# Patient Record
Sex: MALE | Race: WHITE | NOT HISPANIC OR LATINO
[De-identification: names, ages, dates, MRNs, and addresses within clinical notes are randomized per-mention and may not be internally consistent; named-entity substitution may affect disease eponyms.]

---

## 2016-12-30 NOTE — H&P PST ADULT - FAMILY HISTORY
Mother  Still living? No  Family history of throat cancer, Age at diagnosis: Age Unknown     Father  Still living? Yes, Estimated age: 81-90  Family history of peripheral vascular disease, Age at diagnosis: Age Unknown

## 2016-12-30 NOTE — H&P PST ADULT - PROBLEM SELECTOR PLAN 1
right THR, medical clearance requested. pepcid and surgical wash instructions reviewed and verbalized understanding. levothyroxine AM of surgery with sips of water. stop aleve 10 days prior to surgery.

## 2016-12-30 NOTE — H&P PST ADULT - MUSCULOSKELETAL
details… detailed exam no calf tenderness/normal strength/no joint swelling/no joint erythema/ROM intact/increased right groin pain with right hip ROM/no joint warmth

## 2016-12-30 NOTE — H&P PST ADULT - HISTORY OF PRESENT ILLNESS
56 year old male presents with 5 year history of right hip and groin pain which worsened over the last year. Also reports low back pain. Denies receiving any PT or injections into the hip. Pain 5/10 at rest and 8/10 with activity. Pain relieved with aleve.

## 2016-12-30 NOTE — H&P PST ADULT - RS GEN PE MLT RESP DETAILS PC
no rhonchi/breath sounds equal/no wheezes/no rales/no intercostal retractions/clear to auscultation bilaterally/no chest wall tenderness/good air movement/respirations non-labored/airway patent

## 2017-01-03 ENCOUNTER — APPOINTMENT (OUTPATIENT)
Dept: ORTHOPEDIC SURGERY | Facility: CLINIC | Age: 57
End: 2017-01-03

## 2017-01-10 ENCOUNTER — TRANSCRIPTION ENCOUNTER (OUTPATIENT)
Age: 57
End: 2017-01-10

## 2017-01-17 ENCOUNTER — RESULT REVIEW (OUTPATIENT)
Age: 57
End: 2017-01-17

## 2017-01-17 RX ORDER — SODIUM CHLORIDE 9 MG/ML
1000 INJECTION, SOLUTION INTRAVENOUS
Qty: 0 | Refills: 0 | Status: DISCONTINUED | OUTPATIENT
Start: 2017-01-18 | End: 2017-01-20

## 2017-01-17 RX ORDER — SENNA PLUS 8.6 MG/1
2 TABLET ORAL AT BEDTIME
Qty: 0 | Refills: 0 | Status: DISCONTINUED | OUTPATIENT
Start: 2017-01-18 | End: 2017-01-20

## 2017-01-17 RX ORDER — POLYETHYLENE GLYCOL 3350 17 G/17G
17 POWDER, FOR SOLUTION ORAL DAILY
Qty: 0 | Refills: 0 | Status: DISCONTINUED | OUTPATIENT
Start: 2017-01-18 | End: 2017-01-20

## 2017-01-17 RX ORDER — CELECOXIB 200 MG/1
200 CAPSULE ORAL ONCE
Qty: 0 | Refills: 0 | Status: COMPLETED | OUTPATIENT
Start: 2017-01-18 | End: 2017-01-18

## 2017-01-17 RX ORDER — DOCUSATE SODIUM 100 MG
100 CAPSULE ORAL THREE TIMES A DAY
Qty: 0 | Refills: 0 | Status: DISCONTINUED | OUTPATIENT
Start: 2017-01-18 | End: 2017-01-20

## 2017-01-17 RX ORDER — MAGNESIUM HYDROXIDE 400 MG/1
30 TABLET, CHEWABLE ORAL DAILY
Qty: 0 | Refills: 0 | Status: DISCONTINUED | OUTPATIENT
Start: 2017-01-18 | End: 2017-01-20

## 2017-01-17 NOTE — PROVIDER CONTACT NOTE (OTHER) - ACTION/TREATMENT ORDERED:
mupericin topical escribed; spoke to patient, understands treatment
spoke to pt. who states using Mupirocin. Verbalized understanding to use 2x/day for a total of 5 days. Reminded to bring checklist to hospital on day of surgery
labs faxed office notified

## 2017-01-17 NOTE — PATIENT PROFILE ADULT. - PMH
ADHD (attention deficit hyperactivity disorder)    Hypothyroid    Lumbar disc herniation  L4-5, intermittent right lower leg tingling  Muscle atrophy  right leg  Osteoarthritis

## 2017-01-18 ENCOUNTER — APPOINTMENT (OUTPATIENT)
Dept: ORTHOPEDIC SURGERY | Facility: HOSPITAL | Age: 57
End: 2017-01-18

## 2017-01-18 ENCOUNTER — INPATIENT (INPATIENT)
Facility: HOSPITAL | Age: 57
LOS: 1 days | Discharge: HOME | DRG: 470 | End: 2017-01-20
Attending: INTERNAL MEDICINE | Admitting: ORTHOPAEDIC SURGERY
Payer: COMMERCIAL

## 2017-01-18 VITALS
OXYGEN SATURATION: 98 % | SYSTOLIC BLOOD PRESSURE: 137 MMHG | HEART RATE: 66 BPM | TEMPERATURE: 98 F | RESPIRATION RATE: 18 BRPM | HEIGHT: 71 IN | WEIGHT: 214.73 LBS | DIASTOLIC BLOOD PRESSURE: 84 MMHG

## 2017-01-18 DIAGNOSIS — Z01.818 ENCOUNTER FOR OTHER PREPROCEDURAL EXAMINATION: ICD-10-CM

## 2017-01-18 DIAGNOSIS — M16.0 BILATERAL PRIMARY OSTEOARTHRITIS OF HIP: ICD-10-CM

## 2017-01-18 DIAGNOSIS — Z90.89 ACQUIRED ABSENCE OF OTHER ORGANS: Chronic | ICD-10-CM

## 2017-01-18 LAB
ANION GAP SERPL CALC-SCNC: 11 MMOL/L — SIGNIFICANT CHANGE UP (ref 5–17)
BUN SERPL-MCNC: 15 MG/DL — SIGNIFICANT CHANGE UP (ref 7–23)
CALCIUM SERPL-MCNC: 9.6 MG/DL — SIGNIFICANT CHANGE UP (ref 8.4–10.5)
CHLORIDE SERPL-SCNC: 103 MMOL/L — SIGNIFICANT CHANGE UP (ref 96–108)
CO2 SERPL-SCNC: 23 MMOL/L — SIGNIFICANT CHANGE UP (ref 22–31)
CREAT SERPL-MCNC: 1.3 MG/DL — SIGNIFICANT CHANGE UP (ref 0.5–1.3)
GLUCOSE SERPL-MCNC: 180 MG/DL — HIGH (ref 70–99)
HCT VFR BLD CALC: 45.7 % — SIGNIFICANT CHANGE UP (ref 39–50)
HGB BLD-MCNC: 14.7 G/DL — SIGNIFICANT CHANGE UP (ref 13–17)
POTASSIUM SERPL-MCNC: 3.9 MMOL/L — SIGNIFICANT CHANGE UP (ref 3.5–5.3)
POTASSIUM SERPL-SCNC: 3.9 MMOL/L — SIGNIFICANT CHANGE UP (ref 3.5–5.3)
SODIUM SERPL-SCNC: 137 MMOL/L — SIGNIFICANT CHANGE UP (ref 135–145)

## 2017-01-18 PROCEDURE — 99223 1ST HOSP IP/OBS HIGH 75: CPT

## 2017-01-18 PROCEDURE — 88311 DECALCIFY TISSUE: CPT | Mod: 26

## 2017-01-18 PROCEDURE — 27130 TOTAL HIP ARTHROPLASTY: CPT | Mod: RT

## 2017-01-18 PROCEDURE — 88305 TISSUE EXAM BY PATHOLOGIST: CPT | Mod: 26

## 2017-01-18 RX ORDER — CELECOXIB 200 MG/1
200 CAPSULE ORAL
Qty: 0 | Refills: 0 | Status: DISCONTINUED | OUTPATIENT
Start: 2017-01-18 | End: 2017-01-20

## 2017-01-18 RX ORDER — HYDROMORPHONE HYDROCHLORIDE 2 MG/ML
30 INJECTION INTRAMUSCULAR; INTRAVENOUS; SUBCUTANEOUS
Qty: 0 | Refills: 0 | Status: DISCONTINUED | OUTPATIENT
Start: 2017-01-18 | End: 2017-01-19

## 2017-01-18 RX ORDER — LEVOTHYROXINE SODIUM 125 MCG
175 TABLET ORAL DAILY
Qty: 0 | Refills: 0 | Status: DISCONTINUED | OUTPATIENT
Start: 2017-01-18 | End: 2017-01-20

## 2017-01-18 RX ORDER — ACETAMINOPHEN 500 MG
1000 TABLET ORAL EVERY 6 HOURS
Qty: 0 | Refills: 0 | Status: DISCONTINUED | OUTPATIENT
Start: 2017-01-18 | End: 2017-01-18

## 2017-01-18 RX ORDER — ACETAMINOPHEN 500 MG
1000 TABLET ORAL EVERY 8 HOURS
Qty: 0 | Refills: 0 | Status: DISCONTINUED | OUTPATIENT
Start: 2017-01-19 | End: 2017-01-20

## 2017-01-18 RX ORDER — CEFAZOLIN SODIUM 1 G
2000 VIAL (EA) INJECTION EVERY 8 HOURS
Qty: 0 | Refills: 0 | Status: COMPLETED | OUTPATIENT
Start: 2017-01-18 | End: 2017-01-19

## 2017-01-18 RX ORDER — ACETAMINOPHEN 500 MG
1000 TABLET ORAL EVERY 6 HOURS
Qty: 0 | Refills: 0 | Status: COMPLETED | OUTPATIENT
Start: 2017-01-18 | End: 2017-01-19

## 2017-01-18 RX ORDER — HYDROMORPHONE HYDROCHLORIDE 2 MG/ML
0.5 INJECTION INTRAMUSCULAR; INTRAVENOUS; SUBCUTANEOUS
Qty: 0 | Refills: 0 | Status: DISCONTINUED | OUTPATIENT
Start: 2017-01-18 | End: 2017-01-18

## 2017-01-18 RX ORDER — TRANEXAMIC ACID 100 MG/ML
1000 INJECTION, SOLUTION INTRAVENOUS ONCE
Qty: 0 | Refills: 0 | Status: COMPLETED | OUTPATIENT
Start: 2017-01-18 | End: 2017-01-18

## 2017-01-18 RX ORDER — DEXTROAMPHETAMINE SACCHARATE, AMPHETAMINE ASPARTATE, DEXTROAMPHETAMINE SULFATE AND AMPHETAMINE SULFATE 1.875; 1.875; 1.875; 1.875 MG/1; MG/1; MG/1; MG/1
15 TABLET ORAL
Qty: 0 | Refills: 0 | Status: DISCONTINUED | OUTPATIENT
Start: 2017-01-18 | End: 2017-01-18

## 2017-01-18 RX ORDER — DIPHENHYDRAMINE HCL 50 MG
25 CAPSULE ORAL EVERY 8 HOURS
Qty: 0 | Refills: 0 | Status: DISCONTINUED | OUTPATIENT
Start: 2017-01-18 | End: 2017-01-20

## 2017-01-18 RX ORDER — CEFAZOLIN SODIUM 1 G
2000 VIAL (EA) INJECTION ONCE
Qty: 0 | Refills: 0 | Status: COMPLETED | OUTPATIENT
Start: 2017-01-18 | End: 2017-01-18

## 2017-01-18 RX ORDER — PANTOPRAZOLE SODIUM 20 MG/1
40 TABLET, DELAYED RELEASE ORAL
Qty: 0 | Refills: 0 | Status: DISCONTINUED | OUTPATIENT
Start: 2017-01-18 | End: 2017-01-20

## 2017-01-18 RX ORDER — HYDROMORPHONE HYDROCHLORIDE 2 MG/ML
0.5 INJECTION INTRAMUSCULAR; INTRAVENOUS; SUBCUTANEOUS
Qty: 0 | Refills: 0 | Status: DISCONTINUED | OUTPATIENT
Start: 2017-01-18 | End: 2017-01-20

## 2017-01-18 RX ORDER — ACETAMINOPHEN 500 MG
1000 TABLET ORAL ONCE
Qty: 0 | Refills: 0 | Status: COMPLETED | OUTPATIENT
Start: 2017-01-18 | End: 2017-01-18

## 2017-01-18 RX ORDER — SODIUM CHLORIDE 9 MG/ML
1000 INJECTION, SOLUTION INTRAVENOUS
Qty: 0 | Refills: 0 | Status: DISCONTINUED | OUTPATIENT
Start: 2017-01-18 | End: 2017-01-18

## 2017-01-18 RX ORDER — ASPIRIN/CALCIUM CARB/MAGNESIUM 324 MG
325 TABLET ORAL DAILY
Qty: 0 | Refills: 0 | Status: DISCONTINUED | OUTPATIENT
Start: 2017-01-19 | End: 2017-01-20

## 2017-01-18 RX ORDER — ONDANSETRON 8 MG/1
4 TABLET, FILM COATED ORAL EVERY 6 HOURS
Qty: 0 | Refills: 0 | Status: DISCONTINUED | OUTPATIENT
Start: 2017-01-18 | End: 2017-01-20

## 2017-01-18 RX ORDER — MEPERIDINE HYDROCHLORIDE 50 MG/ML
12.5 INJECTION INTRAMUSCULAR; INTRAVENOUS; SUBCUTANEOUS
Qty: 0 | Refills: 0 | Status: DISCONTINUED | OUTPATIENT
Start: 2017-01-18 | End: 2017-01-18

## 2017-01-18 RX ADMIN — ONDANSETRON 4 MILLIGRAM(S): 8 TABLET, FILM COATED ORAL at 20:00

## 2017-01-18 RX ADMIN — Medication 100 MILLIGRAM(S): at 20:00

## 2017-01-18 RX ADMIN — HYDROMORPHONE HYDROCHLORIDE 30 MILLILITER(S): 2 INJECTION INTRAMUSCULAR; INTRAVENOUS; SUBCUTANEOUS at 19:51

## 2017-01-18 RX ADMIN — SENNA PLUS 2 TABLET(S): 8.6 TABLET ORAL at 21:14

## 2017-01-18 RX ADMIN — Medication 1000 MILLIGRAM(S): at 20:17

## 2017-01-18 RX ADMIN — Medication 100 MILLIGRAM(S): at 21:14

## 2017-01-18 RX ADMIN — CELECOXIB 200 MILLIGRAM(S): 200 CAPSULE ORAL at 11:13

## 2017-01-18 RX ADMIN — HYDROMORPHONE HYDROCHLORIDE 30 MILLILITER(S): 2 INJECTION INTRAMUSCULAR; INTRAVENOUS; SUBCUTANEOUS at 15:35

## 2017-01-18 RX ADMIN — Medication 400 MILLIGRAM(S): at 19:10

## 2017-01-18 NOTE — OCCUPATIONAL THERAPY INITIAL EVALUATION ADULT - IADL RETRAINING, OT EVAL
Patient will increase standing tolerance to 3-5 minutes for grooming at the sink with in 2-3 sessions.

## 2017-01-18 NOTE — PHYSICAL THERAPY INITIAL EVALUATION ADULT - GENERAL OBSERVATIONS, REHAB EVAL
pt resting in PACU. pt had spinal anesthesia. pt with IV pt resting in PACU. pt had spinal anesthesia. pt with IV, hemovac

## 2017-01-19 ENCOUNTER — TRANSCRIPTION ENCOUNTER (OUTPATIENT)
Age: 57
End: 2017-01-19

## 2017-01-19 LAB
ANION GAP SERPL CALC-SCNC: 8 MMOL/L — SIGNIFICANT CHANGE UP (ref 5–17)
BUN SERPL-MCNC: 15 MG/DL — SIGNIFICANT CHANGE UP (ref 7–23)
CALCIUM SERPL-MCNC: 9 MG/DL — SIGNIFICANT CHANGE UP (ref 8.4–10.5)
CHLORIDE SERPL-SCNC: 103 MMOL/L — SIGNIFICANT CHANGE UP (ref 96–108)
CO2 SERPL-SCNC: 27 MMOL/L — SIGNIFICANT CHANGE UP (ref 22–31)
CREAT SERPL-MCNC: 1.12 MG/DL — SIGNIFICANT CHANGE UP (ref 0.5–1.3)
GLUCOSE SERPL-MCNC: 100 MG/DL — HIGH (ref 70–99)
HCT VFR BLD CALC: 37.3 % — LOW (ref 39–50)
HGB BLD-MCNC: 12.9 G/DL — LOW (ref 13–17)
MCHC RBC-ENTMCNC: 28.3 PG — SIGNIFICANT CHANGE UP (ref 27–34)
MCHC RBC-ENTMCNC: 34.6 GM/DL — SIGNIFICANT CHANGE UP (ref 32–36)
MCV RBC AUTO: 81.8 FL — SIGNIFICANT CHANGE UP (ref 80–100)
PLATELET # BLD AUTO: 238 K/UL — SIGNIFICANT CHANGE UP (ref 150–400)
POTASSIUM SERPL-MCNC: 4.1 MMOL/L — SIGNIFICANT CHANGE UP (ref 3.5–5.3)
POTASSIUM SERPL-SCNC: 4.1 MMOL/L — SIGNIFICANT CHANGE UP (ref 3.5–5.3)
RBC # BLD: 4.56 M/UL — SIGNIFICANT CHANGE UP (ref 4.2–5.8)
RBC # FLD: 11.8 % — SIGNIFICANT CHANGE UP (ref 10.3–14.5)
SODIUM SERPL-SCNC: 138 MMOL/L — SIGNIFICANT CHANGE UP (ref 135–145)
WBC # BLD: 11.3 K/UL — HIGH (ref 3.8–10.5)
WBC # FLD AUTO: 11.3 K/UL — HIGH (ref 3.8–10.5)

## 2017-01-19 PROCEDURE — 99232 SBSQ HOSP IP/OBS MODERATE 35: CPT

## 2017-01-19 RX ORDER — CELECOXIB 200 MG/1
1 CAPSULE ORAL
Qty: 40 | Refills: 0 | OUTPATIENT
Start: 2017-01-19

## 2017-01-19 RX ORDER — OXYCODONE HYDROCHLORIDE 5 MG/1
10 TABLET ORAL
Qty: 0 | Refills: 0 | Status: DISCONTINUED | OUTPATIENT
Start: 2017-01-19 | End: 2017-01-20

## 2017-01-19 RX ORDER — OXYCODONE HYDROCHLORIDE 5 MG/1
5 TABLET ORAL
Qty: 0 | Refills: 0 | Status: DISCONTINUED | OUTPATIENT
Start: 2017-01-19 | End: 2017-01-20

## 2017-01-19 RX ADMIN — OXYCODONE HYDROCHLORIDE 10 MILLIGRAM(S): 5 TABLET ORAL at 20:50

## 2017-01-19 RX ADMIN — CELECOXIB 200 MILLIGRAM(S): 200 CAPSULE ORAL at 17:49

## 2017-01-19 RX ADMIN — OXYCODONE HYDROCHLORIDE 10 MILLIGRAM(S): 5 TABLET ORAL at 21:20

## 2017-01-19 RX ADMIN — CELECOXIB 200 MILLIGRAM(S): 200 CAPSULE ORAL at 17:48

## 2017-01-19 RX ADMIN — OXYCODONE HYDROCHLORIDE 10 MILLIGRAM(S): 5 TABLET ORAL at 14:35

## 2017-01-19 RX ADMIN — Medication 1000 MILLIGRAM(S): at 21:00

## 2017-01-19 RX ADMIN — Medication 1000 MILLIGRAM(S): at 00:54

## 2017-01-19 RX ADMIN — Medication 100 MILLIGRAM(S): at 21:25

## 2017-01-19 RX ADMIN — Medication 325 MILLIGRAM(S): at 09:30

## 2017-01-19 RX ADMIN — Medication 1000 MILLIGRAM(S): at 13:09

## 2017-01-19 RX ADMIN — OXYCODONE HYDROCHLORIDE 10 MILLIGRAM(S): 5 TABLET ORAL at 14:05

## 2017-01-19 RX ADMIN — Medication 1000 MILLIGRAM(S): at 06:30

## 2017-01-19 RX ADMIN — Medication 400 MILLIGRAM(S): at 06:29

## 2017-01-19 RX ADMIN — Medication 1000 MILLIGRAM(S): at 13:10

## 2017-01-19 RX ADMIN — PANTOPRAZOLE SODIUM 40 MILLIGRAM(S): 20 TABLET, DELAYED RELEASE ORAL at 06:29

## 2017-01-19 RX ADMIN — Medication 400 MILLIGRAM(S): at 00:51

## 2017-01-19 RX ADMIN — Medication 100 MILLIGRAM(S): at 06:28

## 2017-01-19 RX ADMIN — CELECOXIB 200 MILLIGRAM(S): 200 CAPSULE ORAL at 09:31

## 2017-01-19 RX ADMIN — Medication 175 MICROGRAM(S): at 06:29

## 2017-01-19 RX ADMIN — CELECOXIB 200 MILLIGRAM(S): 200 CAPSULE ORAL at 09:30

## 2017-01-19 RX ADMIN — Medication 100 MILLIGRAM(S): at 03:28

## 2017-01-19 RX ADMIN — SENNA PLUS 2 TABLET(S): 8.6 TABLET ORAL at 21:25

## 2017-01-19 RX ADMIN — HYDROMORPHONE HYDROCHLORIDE 30 MILLILITER(S): 2 INJECTION INTRAMUSCULAR; INTRAVENOUS; SUBCUTANEOUS at 07:31

## 2017-01-19 RX ADMIN — Medication 1000 MILLIGRAM(S): at 21:25

## 2017-01-19 RX ADMIN — Medication 100 MILLIGRAM(S): at 13:10

## 2017-01-19 NOTE — DISCHARGE NOTE ADULT - NS AS ACTIVITY OBS
Do not drive or operate machinery/No Heavy lifting/straining/Stairs allowed/Showering allowed/Walking-Outdoors allowed/Walking-Indoors allowed

## 2017-01-19 NOTE — DISCHARGE NOTE ADULT - HOSPITAL COURSE
This patient was admitted to [facility] with a history of severe degenerative joint disease of the right hip.  Patient went to Pre-Surgical Testing at [facility] and was medically cleared to undergo elective procedure.  No operative or rodolfo-operative complications arose during patients hospital course.  Patient received antibiotic according to SCIP guidelines for infection prevention. Ecotrin was given for DVT prophylaxis.  Anesthesia, Medical Hospitalist, Physical Therapy and Occupational Therapy were consulted. Patient is stable for discharge with a good prognosis.  Appropriate discharge instructions and medications are provided in this document.

## 2017-01-19 NOTE — DISCHARGE NOTE ADULT - PLAN OF CARE
Improve quality of life Physical Therapy/Occupational Therapy for: Ambulation, transfers, stairs, & ADLs, isometrics.   Full weight bearing on both legs; Walker/cane use as instructed by Physical therapy/Occupational therapy. Anterior Total Hip Replacement precautions for  6 weeks: No straight leg raise; No external rotation of hip when extended-standing or lying flat; No hyperextension of hip when standing (kickback).   Ice packs to hip for 30 min. every 3 hours and after physical therapy.   Keep incision clean and dry. May shower 5 days after surgery if no drainage from incision.  Prineo tape/suture removal on/near after Post Op Day # 14 in Surgeon's office.  • Do not take a tub bath yet.   • Do not resume driving until you have your surgeon’s permission. Call your doctor if you experience:  • An increase in pain not controlled by pain medication or change in activity or  position.  • Temperature greater than 101° F.  • Redness, increased swelling or foul smelling drainage from or around the  incision.  • Numbness, tingling or a change in color or temperature of the operative leg.  • Call your doctor immediately if you experience chest pain, shortness of breath or calf pain.    For Constipation :   • Increase your water intake. Drink at least 8 glasses of water daily.  • Try adding fiber to your diet by eating fruits, vegetables and foods that are rich in grains.  • If you do experience constipation, you may take an over-the-counter stool softener/laxative such as Colace, Senokot or Milk of Magnesia. prevent blood clots take aspirin 325mg once a day until 2/8/2017 then switch to twice a day for another 3 weeks

## 2017-01-19 NOTE — DISCHARGE NOTE ADULT - PATIENT PORTAL LINK FT
“You can access the FollowHealth Patient Portal, offered by [facility], by registering with the following website: [email or web address]”

## 2017-01-19 NOTE — DISCHARGE NOTE ADULT - CARE PROVIDER_API CALL
Christian Ortiz), Orthopaedic Surgery  [address] [place] [address]  Phone: [phone]  Fax: [phone]

## 2017-01-19 NOTE — DISCHARGE NOTE ADULT - CARE PLAN
Principal Discharge DX:	Status post total hip replacement, right  Goal:	Improve quality of life  Instructions for follow-up, activity and diet:	Physical Therapy/Occupational Therapy for: Ambulation, transfers, stairs, & ADLs, isometrics.   Full weight bearing on both legs; Walker/cane use as instructed by Physical therapy/Occupational therapy. Anterior Total Hip Replacement precautions for  6 weeks: No straight leg raise; No external rotation of hip when extended-standing or lying flat; No hyperextension of hip when standing (kickback).   Ice packs to hip for 30 min. every 3 hours and after physical therapy.   Keep incision clean and dry. May shower 5 days after surgery if no drainage from incision.  Prineo tape/suture removal on/near after Post Op Day # 14 in Surgeon's office.  • Do not take a tub bath yet.   • Do not resume driving until you have your surgeon’s permission.  Instructions for follow-up, activity and diet:	Call your doctor if you experience:  • An increase in pain not controlled by pain medication or change in activity or  position.  • Temperature greater than 101° F.  • Redness, increased swelling or foul smelling drainage from or around the  incision.  • Numbness, tingling or a change in color or temperature of the operative leg.  • Call your doctor immediately if you experience chest pain, shortness of breath or calf pain.    For Constipation :   • Increase your water intake. Drink at least 8 glasses of water daily.  • Try adding fiber to your diet by eating fruits, vegetables and foods that are rich in grains.  • If you do experience constipation, you may take an over-the-counter stool softener/laxative such as Colace, Senokot or Milk of Magnesia. Principal Discharge DX:	Status post total hip replacement, right  Goal:	Improve quality of life  Instructions for follow-up, activity and diet:	Physical Therapy/Occupational Therapy for: Ambulation, transfers, stairs, & ADLs, isometrics.   Full weight bearing on both legs; Walker/cane use as instructed by Physical therapy/Occupational therapy. Anterior Total Hip Replacement precautions for  6 weeks: No straight leg raise; No external rotation of hip when extended-standing or lying flat; No hyperextension of hip when standing (kickback).   Ice packs to hip for 30 min. every 3 hours and after physical therapy.   Keep incision clean and dry. May shower 5 days after surgery if no drainage from incision.  Prineo tape/suture removal on/near after Post Op Day # 14 in Surgeon's office.  • Do not take a tub bath yet.   • Do not resume driving until you have your surgeon’s permission.  Instructions for follow-up, activity and diet:	Call your doctor if you experience:  • An increase in pain not controlled by pain medication or change in activity or  position.  • Temperature greater than 101° F.  • Redness, increased swelling or foul smelling drainage from or around the  incision.  • Numbness, tingling or a change in color or temperature of the operative leg.  • Call your doctor immediately if you experience chest pain, shortness of breath or calf pain.    For Constipation :   • Increase your water intake. Drink at least 8 glasses of water daily.  • Try adding fiber to your diet by eating fruits, vegetables and foods that are rich in grains.  • If you do experience constipation, you may take an over-the-counter stool softener/laxative such as Colace, Senokot or Milk of Magnesia.  Goal:	prevent blood clots  Instructions for follow-up, activity and diet:	take aspirin 325mg once a day until 2/8/2017 then switch to twice a day for another 3 weeks

## 2017-01-19 NOTE — DISCHARGE NOTE ADULT - MEDICATION SUMMARY - MEDICATIONS TO TAKE
I will START or STAY ON the medications listed below when I get home from the hospital:    rolling walker  -- Dx: Right THR  -- Indication: For ambulation    3:1 commode  -- Dx: Right THR  -- Indication: For activities of daily living    celecoxib 200 mg oral capsule  -- 1 cap(s) by mouth 2 times a day (before meals)  -- Indication: For Prevent bony overgrowth    oxyCODONE 5 mg oral tablet  -- 1 tab(s) by mouth every 3 hours -for moderate pain MDD:6  -- Indication: For moderate pain    acetaminophen 500 mg oral tablet  -- 2 tab(s) by mouth every 8 hours  -- Indication: For mild pain    aspirin 325 mg oral delayed release tablet  -- 1 tab(s) by mouth once a day until 2/8/2017 then take 1 tab by mouth every 12 hours  -- Indication: For Prevent blood clots    Adderall XR 15 mg oral capsule, extended release  -- 1 cap(s) by mouth once a day (in the morning)  -- sometimes skips a day  -- Indication: For concentration    senna oral tablet  -- 2 tab(s) by mouth once a day (at bedtime), As Needed  -- Indication: For constipation    Fish Oil 1200 mg oral capsule  -- 2 cap(s) by mouth once a day  -- Indication: For Supplement    pantoprazole 40 mg oral delayed release tablet  -- 1 tab(s) by mouth once a day (before a meal) MDD:1  -- Indication: For Prevent ulcers    levothyroxine 175 mcg (0.175 mg) oral tablet  -- 1 tab(s) by mouth once a day  -- Indication: For thyroid    multivitamin  -- 1 tab(s) by mouth once a day  -- Indication: For Supplement    Vitamin C 500 mg oral tablet  -- 1 tab(s) by mouth once a day  -- Indication: For Supplement

## 2017-01-20 VITALS
TEMPERATURE: 98 F | OXYGEN SATURATION: 95 % | HEART RATE: 63 BPM | RESPIRATION RATE: 18 BRPM | SYSTOLIC BLOOD PRESSURE: 114 MMHG | DIASTOLIC BLOOD PRESSURE: 68 MMHG

## 2017-01-20 LAB
ANION GAP SERPL CALC-SCNC: 8 MMOL/L — SIGNIFICANT CHANGE UP (ref 5–17)
BUN SERPL-MCNC: 16 MG/DL — SIGNIFICANT CHANGE UP (ref 7–23)
CALCIUM SERPL-MCNC: 9 MG/DL — SIGNIFICANT CHANGE UP (ref 8.4–10.5)
CHLORIDE SERPL-SCNC: 106 MMOL/L — SIGNIFICANT CHANGE UP (ref 96–108)
CO2 SERPL-SCNC: 26 MMOL/L — SIGNIFICANT CHANGE UP (ref 22–31)
CREAT SERPL-MCNC: 1.13 MG/DL — SIGNIFICANT CHANGE UP (ref 0.5–1.3)
GLUCOSE SERPL-MCNC: 91 MG/DL — SIGNIFICANT CHANGE UP (ref 70–99)
HCT VFR BLD CALC: 39.6 % — SIGNIFICANT CHANGE UP (ref 39–50)
HGB BLD-MCNC: 12.9 G/DL — LOW (ref 13–17)
MCHC RBC-ENTMCNC: 28.1 PG — SIGNIFICANT CHANGE UP (ref 27–34)
MCHC RBC-ENTMCNC: 32.7 GM/DL — SIGNIFICANT CHANGE UP (ref 32–36)
MCV RBC AUTO: 86 FL — SIGNIFICANT CHANGE UP (ref 80–100)
PLATELET # BLD AUTO: 195 K/UL — SIGNIFICANT CHANGE UP (ref 150–400)
POTASSIUM SERPL-MCNC: 3.9 MMOL/L — SIGNIFICANT CHANGE UP (ref 3.5–5.3)
POTASSIUM SERPL-SCNC: 3.9 MMOL/L — SIGNIFICANT CHANGE UP (ref 3.5–5.3)
RBC # BLD: 4.6 M/UL — SIGNIFICANT CHANGE UP (ref 4.2–5.8)
RBC # FLD: 11.9 % — SIGNIFICANT CHANGE UP (ref 10.3–14.5)
SODIUM SERPL-SCNC: 140 MMOL/L — SIGNIFICANT CHANGE UP (ref 135–145)
WBC # BLD: 7.7 K/UL — SIGNIFICANT CHANGE UP (ref 3.8–10.5)
WBC # FLD AUTO: 7.7 K/UL — SIGNIFICANT CHANGE UP (ref 3.8–10.5)

## 2017-01-20 PROCEDURE — 97530 THERAPEUTIC ACTIVITIES: CPT

## 2017-01-20 PROCEDURE — C1889: CPT

## 2017-01-20 PROCEDURE — 36415 COLL VENOUS BLD VENIPUNCTURE: CPT

## 2017-01-20 PROCEDURE — 85027 COMPLETE CBC AUTOMATED: CPT

## 2017-01-20 PROCEDURE — 97116 GAIT TRAINING THERAPY: CPT

## 2017-01-20 PROCEDURE — C1776: CPT

## 2017-01-20 PROCEDURE — 97001: CPT

## 2017-01-20 PROCEDURE — 97161 PT EVAL LOW COMPLEX 20 MIN: CPT

## 2017-01-20 PROCEDURE — 97003: CPT

## 2017-01-20 PROCEDURE — 88305 TISSUE EXAM BY PATHOLOGIST: CPT

## 2017-01-20 PROCEDURE — 80048 BASIC METABOLIC PNL TOTAL CA: CPT

## 2017-01-20 PROCEDURE — C1713: CPT

## 2017-01-20 PROCEDURE — 85018 HEMOGLOBIN: CPT

## 2017-01-20 PROCEDURE — 99239 HOSP IP/OBS DSCHRG MGMT >30: CPT

## 2017-01-20 PROCEDURE — 88311 DECALCIFY TISSUE: CPT

## 2017-01-20 PROCEDURE — 97110 THERAPEUTIC EXERCISES: CPT

## 2017-01-20 PROCEDURE — 76000 FLUOROSCOPY <1 HR PHYS/QHP: CPT

## 2017-01-20 PROCEDURE — 94664 DEMO&/EVAL PT USE INHALER: CPT

## 2017-01-20 PROCEDURE — 97535 SELF CARE MNGMENT TRAINING: CPT

## 2017-01-20 PROCEDURE — 97165 OT EVAL LOW COMPLEX 30 MIN: CPT

## 2017-01-20 RX ORDER — SENNA PLUS 8.6 MG/1
2 TABLET ORAL
Qty: 0 | Refills: 0 | COMMUNITY
Start: 2017-01-20

## 2017-01-20 RX ORDER — FAMOTIDINE 10 MG/ML
0 INJECTION INTRAVENOUS
Qty: 0 | Refills: 0 | COMMUNITY

## 2017-01-20 RX ORDER — PANTOPRAZOLE SODIUM 20 MG/1
1 TABLET, DELAYED RELEASE ORAL
Qty: 40 | Refills: 0 | OUTPATIENT
Start: 2017-01-20 | End: 2017-03-01

## 2017-01-20 RX ORDER — ASPIRIN/CALCIUM CARB/MAGNESIUM 324 MG
1 TABLET ORAL
Qty: 61 | Refills: 0 | OUTPATIENT
Start: 2017-01-20

## 2017-01-20 RX ORDER — OXYCODONE HYDROCHLORIDE 5 MG/1
1 TABLET ORAL
Qty: 80 | Refills: 0 | OUTPATIENT
Start: 2017-01-20

## 2017-01-20 RX ORDER — CLARITHROMYCIN 500 MG
0 TABLET ORAL
Qty: 0 | Refills: 0 | COMMUNITY

## 2017-01-20 RX ORDER — ACETAMINOPHEN 500 MG
2 TABLET ORAL
Qty: 84 | Refills: 0 | OUTPATIENT
Start: 2017-01-20 | End: 2017-02-03

## 2017-01-20 RX ADMIN — Medication 1000 MILLIGRAM(S): at 13:16

## 2017-01-20 RX ADMIN — OXYCODONE HYDROCHLORIDE 5 MILLIGRAM(S): 5 TABLET ORAL at 13:24

## 2017-01-20 RX ADMIN — OXYCODONE HYDROCHLORIDE 10 MILLIGRAM(S): 5 TABLET ORAL at 06:15

## 2017-01-20 RX ADMIN — POLYETHYLENE GLYCOL 3350 17 GRAM(S): 17 POWDER, FOR SOLUTION ORAL at 09:25

## 2017-01-20 RX ADMIN — Medication 100 MILLIGRAM(S): at 13:16

## 2017-01-20 RX ADMIN — MAGNESIUM HYDROXIDE 30 MILLILITER(S): 400 TABLET, CHEWABLE ORAL at 13:24

## 2017-01-20 RX ADMIN — OXYCODONE HYDROCHLORIDE 5 MILLIGRAM(S): 5 TABLET ORAL at 14:00

## 2017-01-20 RX ADMIN — Medication 100 MILLIGRAM(S): at 06:16

## 2017-01-20 RX ADMIN — Medication 1000 MILLIGRAM(S): at 06:00

## 2017-01-20 RX ADMIN — OXYCODONE HYDROCHLORIDE 10 MILLIGRAM(S): 5 TABLET ORAL at 05:45

## 2017-01-20 RX ADMIN — Medication 175 MICROGRAM(S): at 06:16

## 2017-01-20 RX ADMIN — CELECOXIB 200 MILLIGRAM(S): 200 CAPSULE ORAL at 08:55

## 2017-01-20 RX ADMIN — PANTOPRAZOLE SODIUM 40 MILLIGRAM(S): 20 TABLET, DELAYED RELEASE ORAL at 06:16

## 2017-01-20 RX ADMIN — Medication 1000 MILLIGRAM(S): at 06:16

## 2017-01-20 RX ADMIN — Medication 325 MILLIGRAM(S): at 08:55

## 2017-01-23 ENCOUNTER — EMERGENCY (EMERGENCY)
Facility: HOSPITAL | Age: 57
LOS: 1 days | Discharge: HOME | End: 2017-01-23
Admitting: INTERNAL MEDICINE
Payer: COMMERCIAL

## 2017-01-23 DIAGNOSIS — Z90.89 ACQUIRED ABSENCE OF OTHER ORGANS: Chronic | ICD-10-CM

## 2017-01-23 PROCEDURE — 99053 MED SERV 10PM-8AM 24 HR FAC: CPT

## 2017-01-23 PROCEDURE — 93971 EXTREMITY STUDY: CPT

## 2017-01-23 PROCEDURE — 99284 EMERGENCY DEPT VISIT MOD MDM: CPT | Mod: 25

## 2017-02-01 ENCOUNTER — APPOINTMENT (OUTPATIENT)
Dept: ORTHOPEDIC SURGERY | Facility: CLINIC | Age: 57
End: 2017-02-01

## 2017-02-23 ENCOUNTER — CHART COPY (OUTPATIENT)
Age: 57
End: 2017-02-23

## 2017-03-08 ENCOUNTER — CHART COPY (OUTPATIENT)
Age: 57
End: 2017-03-08

## 2017-03-21 ENCOUNTER — APPOINTMENT (OUTPATIENT)
Dept: ORTHOPEDIC SURGERY | Facility: CLINIC | Age: 57
End: 2017-03-21

## 2017-03-21 VITALS
DIASTOLIC BLOOD PRESSURE: 65 MMHG | WEIGHT: 220 LBS | SYSTOLIC BLOOD PRESSURE: 123 MMHG | HEART RATE: 57 BPM | HEIGHT: 71 IN | BODY MASS INDEX: 30.8 KG/M2

## 2017-06-14 NOTE — H&P PST ADULT - NSANTHOSAYNRD_GEN_A_CORE
I will SWITCH the dose or number of times a day I take the medications listed below when I get home from the hospital:  None
No. GURWINDER screening performed.  STOP BANG Legend: 0-2 = LOW Risk; 3-4 = INTERMEDIATE Risk; 5-8 = HIGH Risk/neck 16.5 inches

## 2017-07-28 ENCOUNTER — APPOINTMENT (OUTPATIENT)
Dept: ORTHOPEDIC SURGERY | Facility: CLINIC | Age: 57
End: 2017-07-28
Payer: COMMERCIAL

## 2017-07-28 VITALS
WEIGHT: 220 LBS | HEART RATE: 63 BPM | DIASTOLIC BLOOD PRESSURE: 76 MMHG | SYSTOLIC BLOOD PRESSURE: 131 MMHG | BODY MASS INDEX: 30.8 KG/M2 | HEIGHT: 71 IN

## 2017-07-28 PROCEDURE — 99213 OFFICE O/P EST LOW 20 MIN: CPT

## 2017-07-28 PROCEDURE — 73502 X-RAY EXAM HIP UNI 2-3 VIEWS: CPT | Mod: RT

## 2017-10-24 ENCOUNTER — CHART COPY (OUTPATIENT)
Age: 57
End: 2017-10-24

## 2018-01-09 ENCOUNTER — APPOINTMENT (OUTPATIENT)
Dept: ORTHOPEDIC SURGERY | Facility: CLINIC | Age: 58
End: 2018-01-09
Payer: COMMERCIAL

## 2018-01-09 VITALS
DIASTOLIC BLOOD PRESSURE: 71 MMHG | BODY MASS INDEX: 30.8 KG/M2 | SYSTOLIC BLOOD PRESSURE: 135 MMHG | HEART RATE: 59 BPM | HEIGHT: 71 IN | WEIGHT: 220 LBS

## 2018-01-09 PROCEDURE — 99213 OFFICE O/P EST LOW 20 MIN: CPT

## 2018-01-09 PROCEDURE — 73521 X-RAY EXAM HIPS BI 2 VIEWS: CPT

## 2018-01-09 RX ORDER — ASPIRIN 325 MG/1
325 TABLET, FILM COATED ORAL
Refills: 0 | Status: DISCONTINUED | COMMUNITY
End: 2018-01-09

## 2018-01-09 RX ORDER — CELECOXIB 200 MG/1
200 CAPSULE ORAL
Refills: 0 | Status: DISCONTINUED | COMMUNITY
End: 2018-01-09

## 2018-01-09 RX ORDER — OXYCODONE HYDROCHLORIDE 5 MG/1
5 CAPSULE ORAL EVERY 4 HOURS
Qty: 20 | Refills: 0 | Status: DISCONTINUED | COMMUNITY
Start: 2017-03-21 | End: 2018-01-09

## 2018-01-09 RX ORDER — CELECOXIB 200 MG/1
200 CAPSULE ORAL DAILY
Qty: 60 | Refills: 2 | Status: DISCONTINUED | COMMUNITY
Start: 2017-03-21 | End: 2018-01-09

## 2018-01-09 RX ORDER — OXYCODONE 5 MG/1
5 TABLET ORAL
Qty: 60 | Refills: 0 | Status: DISCONTINUED | COMMUNITY
Start: 2017-02-08 | End: 2018-01-09

## 2018-01-09 RX ORDER — OXYCODONE 5 MG/1
5 TABLET ORAL
Qty: 60 | Refills: 0 | Status: DISCONTINUED | COMMUNITY
Start: 2017-02-23 | End: 2018-01-09

## 2018-01-09 RX ORDER — CELECOXIB 200 MG/1
200 CAPSULE ORAL
Qty: 30 | Refills: 0 | Status: DISCONTINUED | COMMUNITY
Start: 2017-03-08 | End: 2018-01-09

## 2018-01-09 RX ORDER — CELECOXIB 200 MG/1
200 CAPSULE ORAL DAILY
Qty: 30 | Refills: 1 | Status: DISCONTINUED | COMMUNITY
Start: 2017-07-28 | End: 2018-01-09

## 2018-02-14 NOTE — DISCHARGE NOTE ADULT - MEDICATION SUMMARY - MEDICATIONS TO STOP TAKING
Hydronephrosis  02/14/2018    Active  Yinka James I will STOP taking the medications listed below when I get home from the hospital:    Aleve 220 mg oral tablet  -- 2 tab(s) by mouth every 8 hours, As Needed - for moderate pain    mupirocin topical 2% topical ointment  -- Apply to each nostril 2 times a day  -- For external use only.    Pepcid 20 mg oral tablet  --  by mouth twice pre op    Zithromax Z-Donnie 250 mg oral tablet  --  by mouth   -- seen for congestion

## 2018-06-12 ENCOUNTER — CHART COPY (OUTPATIENT)
Age: 58
End: 2018-06-12

## 2018-07-06 NOTE — OCCUPATIONAL THERAPY INITIAL EVALUATION ADULT - BED MOBILITY/TRANSFERS, PREVIOUS LEVEL OF FUNCTION, OT EVAL
Per pt. woke up at 00:00 with abdominal pain and 4 episodes of NB/NB emesis. Denies diarrhea/fevers. Abdomen soft, tender in LLQ. Tylenol 1AM. A&OX3. Denies PMH/PSH. NKDA. VUTD.
independent

## 2018-08-28 ENCOUNTER — EMERGENCY (EMERGENCY)
Facility: HOSPITAL | Age: 58
LOS: 0 days | Discharge: HOME | End: 2018-08-28
Attending: EMERGENCY MEDICINE | Admitting: EMERGENCY MEDICINE
Payer: COMMERCIAL

## 2018-08-28 VITALS
TEMPERATURE: 98 F | OXYGEN SATURATION: 100 % | RESPIRATION RATE: 22 BRPM | DIASTOLIC BLOOD PRESSURE: 87 MMHG | HEART RATE: 71 BPM | SYSTOLIC BLOOD PRESSURE: 141 MMHG

## 2018-08-28 VITALS — HEIGHT: 71 IN | WEIGHT: 220.02 LBS

## 2018-08-28 DIAGNOSIS — M54.2 CERVICALGIA: ICD-10-CM

## 2018-08-28 DIAGNOSIS — Z90.89 ACQUIRED ABSENCE OF OTHER ORGANS: Chronic | ICD-10-CM

## 2018-08-28 DIAGNOSIS — E03.9 HYPOTHYROIDISM, UNSPECIFIED: ICD-10-CM

## 2018-08-28 DIAGNOSIS — M54.9 DORSALGIA, UNSPECIFIED: ICD-10-CM

## 2018-08-28 DIAGNOSIS — R09.89 OTHER SPECIFIED SYMPTOMS AND SIGNS INVOLVING THE CIRCULATORY AND RESPIRATORY SYSTEMS: ICD-10-CM

## 2018-08-28 LAB
ALBUMIN SERPL ELPH-MCNC: 4 G/DL — SIGNIFICANT CHANGE UP (ref 3.3–5)
ALP SERPL-CCNC: 60 U/L — SIGNIFICANT CHANGE UP (ref 40–120)
ALT FLD-CCNC: 56 U/L — SIGNIFICANT CHANGE UP (ref 12–78)
ANION GAP SERPL CALC-SCNC: 6 MMOL/L — SIGNIFICANT CHANGE UP (ref 5–17)
AST SERPL-CCNC: 30 U/L — SIGNIFICANT CHANGE UP (ref 15–37)
BASOPHILS # BLD AUTO: 0.03 K/UL — SIGNIFICANT CHANGE UP (ref 0–0.2)
BASOPHILS NFR BLD AUTO: 0.3 % — SIGNIFICANT CHANGE UP (ref 0–2)
BILIRUB SERPL-MCNC: 0.7 MG/DL — SIGNIFICANT CHANGE UP (ref 0.2–1.2)
BUN SERPL-MCNC: 16 MG/DL — SIGNIFICANT CHANGE UP (ref 7–23)
CALCIUM SERPL-MCNC: 9.1 MG/DL — SIGNIFICANT CHANGE UP (ref 8.5–10.1)
CHLORIDE SERPL-SCNC: 106 MMOL/L — SIGNIFICANT CHANGE UP (ref 96–108)
CO2 SERPL-SCNC: 26 MMOL/L — SIGNIFICANT CHANGE UP (ref 22–31)
CREAT SERPL-MCNC: 1.22 MG/DL — SIGNIFICANT CHANGE UP (ref 0.5–1.3)
EOSINOPHIL # BLD AUTO: 0.06 K/UL — SIGNIFICANT CHANGE UP (ref 0–0.5)
EOSINOPHIL NFR BLD AUTO: 0.5 % — SIGNIFICANT CHANGE UP (ref 0–6)
GLUCOSE SERPL-MCNC: 160 MG/DL — HIGH (ref 70–99)
HCT VFR BLD CALC: 43.7 % — SIGNIFICANT CHANGE UP (ref 39–50)
HGB BLD-MCNC: 15.1 G/DL — SIGNIFICANT CHANGE UP (ref 13–17)
IMM GRANULOCYTES NFR BLD AUTO: 0.5 % — SIGNIFICANT CHANGE UP (ref 0–1.5)
INR BLD: 1.01 RATIO — SIGNIFICANT CHANGE UP (ref 0.88–1.16)
LYMPHOCYTES # BLD AUTO: 0.65 K/UL — LOW (ref 1–3.3)
LYMPHOCYTES # BLD AUTO: 5.9 % — LOW (ref 13–44)
MCHC RBC-ENTMCNC: 29.4 PG — SIGNIFICANT CHANGE UP (ref 27–34)
MCHC RBC-ENTMCNC: 34.6 GM/DL — SIGNIFICANT CHANGE UP (ref 32–36)
MCV RBC AUTO: 85 FL — SIGNIFICANT CHANGE UP (ref 80–100)
MONOCYTES # BLD AUTO: 0.77 K/UL — SIGNIFICANT CHANGE UP (ref 0–0.9)
MONOCYTES NFR BLD AUTO: 7 % — SIGNIFICANT CHANGE UP (ref 2–14)
NEUTROPHILS # BLD AUTO: 9.4 K/UL — HIGH (ref 1.8–7.4)
NEUTROPHILS NFR BLD AUTO: 85.8 % — HIGH (ref 43–77)
NRBC # BLD: 0 /100 WBCS — SIGNIFICANT CHANGE UP (ref 0–0)
PLATELET # BLD AUTO: 160 K/UL — SIGNIFICANT CHANGE UP (ref 150–400)
POTASSIUM SERPL-MCNC: 3.9 MMOL/L — SIGNIFICANT CHANGE UP (ref 3.5–5.3)
POTASSIUM SERPL-SCNC: 3.9 MMOL/L — SIGNIFICANT CHANGE UP (ref 3.5–5.3)
PROT SERPL-MCNC: 7.6 GM/DL — SIGNIFICANT CHANGE UP (ref 6–8.3)
PROTHROM AB SERPL-ACNC: 10.9 SEC — SIGNIFICANT CHANGE UP (ref 9.8–12.7)
RBC # BLD: 5.14 M/UL — SIGNIFICANT CHANGE UP (ref 4.2–5.8)
RBC # FLD: 12.8 % — SIGNIFICANT CHANGE UP (ref 10.3–14.5)
SODIUM SERPL-SCNC: 138 MMOL/L — SIGNIFICANT CHANGE UP (ref 135–145)
TROPONIN I SERPL-MCNC: <0.015 NG/ML — SIGNIFICANT CHANGE UP (ref 0.01–0.04)
WBC # BLD: 10.96 K/UL — HIGH (ref 3.8–10.5)
WBC # FLD AUTO: 10.96 K/UL — HIGH (ref 3.8–10.5)

## 2018-08-28 PROCEDURE — 71046 X-RAY EXAM CHEST 2 VIEWS: CPT | Mod: 26

## 2018-08-28 PROCEDURE — 73030 X-RAY EXAM OF SHOULDER: CPT | Mod: 26,50

## 2018-08-28 PROCEDURE — 93010 ELECTROCARDIOGRAM REPORT: CPT

## 2018-08-28 PROCEDURE — 99285 EMERGENCY DEPT VISIT HI MDM: CPT

## 2018-08-28 PROCEDURE — 74174 CTA ABD&PLVS W/CONTRAST: CPT | Mod: 26

## 2018-08-28 PROCEDURE — 71275 CT ANGIOGRAPHY CHEST: CPT | Mod: 26

## 2018-08-28 RX ORDER — MORPHINE SULFATE 50 MG/1
4 CAPSULE, EXTENDED RELEASE ORAL ONCE
Qty: 0 | Refills: 0 | Status: DISCONTINUED | OUTPATIENT
Start: 2018-08-28 | End: 2018-08-28

## 2018-08-28 RX ORDER — FAMOTIDINE 10 MG/ML
1 INJECTION INTRAVENOUS
Qty: 10 | Refills: 0 | OUTPATIENT
Start: 2018-08-28 | End: 2018-09-06

## 2018-08-28 RX ORDER — ONDANSETRON 8 MG/1
4 TABLET, FILM COATED ORAL ONCE
Qty: 0 | Refills: 0 | Status: COMPLETED | OUTPATIENT
Start: 2018-08-28 | End: 2018-08-28

## 2018-08-28 RX ORDER — DEXAMETHASONE 0.5 MG/5ML
6 ELIXIR ORAL ONCE
Qty: 0 | Refills: 0 | Status: COMPLETED | OUTPATIENT
Start: 2018-08-28 | End: 2018-08-28

## 2018-08-28 RX ORDER — PANTOPRAZOLE SODIUM 20 MG/1
1 TABLET, DELAYED RELEASE ORAL
Qty: 10 | Refills: 0 | OUTPATIENT
Start: 2018-08-28 | End: 2018-09-06

## 2018-08-28 RX ORDER — DIAZEPAM 5 MG
5 TABLET ORAL ONCE
Qty: 0 | Refills: 0 | Status: DISCONTINUED | OUTPATIENT
Start: 2018-08-28 | End: 2018-08-28

## 2018-08-28 RX ORDER — HYDROMORPHONE HYDROCHLORIDE 2 MG/ML
1 INJECTION INTRAMUSCULAR; INTRAVENOUS; SUBCUTANEOUS ONCE
Qty: 0 | Refills: 0 | Status: DISCONTINUED | OUTPATIENT
Start: 2018-08-28 | End: 2018-08-28

## 2018-08-28 RX ORDER — IBUPROFEN 200 MG
1 TABLET ORAL
Qty: 20 | Refills: 0 | OUTPATIENT
Start: 2018-08-28

## 2018-08-28 RX ORDER — SODIUM CHLORIDE 9 MG/ML
1000 INJECTION INTRAMUSCULAR; INTRAVENOUS; SUBCUTANEOUS ONCE
Qty: 0 | Refills: 0 | Status: COMPLETED | OUTPATIENT
Start: 2018-08-28 | End: 2018-08-28

## 2018-08-28 RX ORDER — KETOROLAC TROMETHAMINE 30 MG/ML
30 SYRINGE (ML) INJECTION ONCE
Qty: 0 | Refills: 0 | Status: DISCONTINUED | OUTPATIENT
Start: 2018-08-28 | End: 2018-08-28

## 2018-08-28 RX ORDER — DIAZEPAM 5 MG
1 TABLET ORAL
Qty: 6 | Refills: 0 | OUTPATIENT
Start: 2018-08-28

## 2018-08-28 RX ADMIN — Medication 5 MILLIGRAM(S): at 22:26

## 2018-08-28 RX ADMIN — MORPHINE SULFATE 4 MILLIGRAM(S): 50 CAPSULE, EXTENDED RELEASE ORAL at 18:52

## 2018-08-28 RX ADMIN — Medication 6 MILLIGRAM(S): at 22:26

## 2018-08-28 RX ADMIN — ONDANSETRON 4 MILLIGRAM(S): 8 TABLET, FILM COATED ORAL at 18:52

## 2018-08-28 RX ADMIN — SODIUM CHLORIDE 2000 MILLILITER(S): 9 INJECTION INTRAMUSCULAR; INTRAVENOUS; SUBCUTANEOUS at 19:33

## 2018-08-28 RX ADMIN — Medication 30 MILLIGRAM(S): at 22:03

## 2018-08-28 RX ADMIN — MORPHINE SULFATE 4 MILLIGRAM(S): 50 CAPSULE, EXTENDED RELEASE ORAL at 21:27

## 2018-08-28 RX ADMIN — HYDROMORPHONE HYDROCHLORIDE 1 MILLIGRAM(S): 2 INJECTION INTRAMUSCULAR; INTRAVENOUS; SUBCUTANEOUS at 19:32

## 2018-08-28 NOTE — ED ADULT NURSE NOTE - NSIMPLEMENTINTERV_GEN_ALL_ED
Implemented All Universal Safety Interventions:  [place] to call system. Call bell, personal items and telephone within reach. Instruct patient to call for assistance. Room bathroom lighting operational. Non-slip footwear when patient is off stretcher. Physically safe environment: no spills, clutter or unnecessary equipment. Stretcher in lowest position, wheels locked, appropriate side rails in place.

## 2018-08-28 NOTE — ED ADULT NURSE NOTE - OBJECTIVE STATEMENT
Pt reports having "lump" in throat x several months, and bending head forward today and having worsening throat pain.  Associated with throat pain was new R shoulder pain and worsening generalized joint pain.   20g PIV placed in LAC.

## 2018-08-28 NOTE — ED ADULT TRIAGE NOTE - CHIEF COMPLAINT QUOTE
pt presents with c/o " something in my throat making it hard to swallow the last few months, today I bent my head forward and suddenly I felt the pain in my shoulder joints and now my hip replacement hurts even more but I know it started with my throat"

## 2018-08-28 NOTE — ED STATDOCS - OBJECTIVE STATEMENT
58 y/o male with a PMHx of hypothyroid (Synthroid) presents to the ED c/o difficulty swallowing x few months. +pain in shoulder. Denies fever, chills, n/v/d. Has not been sick recently. Has never seen a cardiologist. PCP: Dr. Monet. 56 y/o male with a PMHx of hypothyroid (Synthroid) presents to the ED c/o difficulty swallowing x few months, presents with acute neck and right shoulder pain.  States symptoms started a few hours ago after looking down with his neck.  +pain in right shoulder which he feels radiates to his right neck. Pain exacerbated with movement of right shoulder.  Denies fever, chills, chest pain, SOB, back pain, n/v/d. Has not been sick recently. Has never seen a cardiologist. PCP: Dr. Monet.

## 2018-08-28 NOTE — ED STATDOCS - MEDICAL DECISION MAKING DETAILS
No signs of dissection.  EKG normal.  Troponin undetectable.  Degenerative changes of the C spine, bilateral shoulders, likely cause of pain.  D/c home with symptomatic care, f/u with PCP.

## 2018-08-28 NOTE — ED STATDOCS - MUSCULOSKELETAL, MLM
range of motion is not limited. Limited ROM of right shoulder. Reproducible pain with passive ROM. No deformity.

## 2018-08-28 NOTE — ED STATDOCS - CARE PLAN
Principal Discharge DX:	Neck pain  Secondary Diagnosis:	Back pain  Secondary Diagnosis:	Foreign body sensation in throat

## 2018-08-30 ENCOUNTER — EMERGENCY (EMERGENCY)
Facility: HOSPITAL | Age: 58
LOS: 1 days | Discharge: HOME | End: 2018-08-30
Attending: EMERGENCY MEDICINE
Payer: COMMERCIAL

## 2018-08-30 VITALS
WEIGHT: 220.02 LBS | DIASTOLIC BLOOD PRESSURE: 70 MMHG | OXYGEN SATURATION: 96 % | HEIGHT: 71 IN | TEMPERATURE: 98 F | HEART RATE: 74 BPM | RESPIRATION RATE: 18 BRPM | SYSTOLIC BLOOD PRESSURE: 130 MMHG

## 2018-08-30 VITALS
TEMPERATURE: 98 F | OXYGEN SATURATION: 96 % | RESPIRATION RATE: 18 BRPM | DIASTOLIC BLOOD PRESSURE: 73 MMHG | SYSTOLIC BLOOD PRESSURE: 124 MMHG | HEART RATE: 62 BPM

## 2018-08-30 DIAGNOSIS — Z90.89 ACQUIRED ABSENCE OF OTHER ORGANS: Chronic | ICD-10-CM

## 2018-08-30 LAB
ALBUMIN SERPL ELPH-MCNC: 4.2 G/DL — SIGNIFICANT CHANGE UP (ref 3.3–5)
ALP SERPL-CCNC: 53 U/L — SIGNIFICANT CHANGE UP (ref 40–120)
ALT FLD-CCNC: 28 U/L — SIGNIFICANT CHANGE UP (ref 10–45)
ANION GAP SERPL CALC-SCNC: 13 MMOL/L — SIGNIFICANT CHANGE UP (ref 5–17)
AST SERPL-CCNC: 16 U/L — SIGNIFICANT CHANGE UP (ref 10–40)
BASOPHILS # BLD AUTO: 0 K/UL — SIGNIFICANT CHANGE UP (ref 0–0.2)
BASOPHILS NFR BLD AUTO: 0.1 % — SIGNIFICANT CHANGE UP (ref 0–2)
BILIRUB SERPL-MCNC: 0.9 MG/DL — SIGNIFICANT CHANGE UP (ref 0.2–1.2)
BUN SERPL-MCNC: 13 MG/DL — SIGNIFICANT CHANGE UP (ref 7–23)
CALCIUM SERPL-MCNC: 10 MG/DL — SIGNIFICANT CHANGE UP (ref 8.4–10.5)
CHLORIDE SERPL-SCNC: 102 MMOL/L — SIGNIFICANT CHANGE UP (ref 96–108)
CO2 SERPL-SCNC: 23 MMOL/L — SIGNIFICANT CHANGE UP (ref 22–31)
CREAT SERPL-MCNC: 1.1 MG/DL — SIGNIFICANT CHANGE UP (ref 0.5–1.3)
EOSINOPHIL # BLD AUTO: 0 K/UL — SIGNIFICANT CHANGE UP (ref 0–0.5)
EOSINOPHIL NFR BLD AUTO: 0.2 % — SIGNIFICANT CHANGE UP (ref 0–6)
GLUCOSE SERPL-MCNC: 120 MG/DL — HIGH (ref 70–99)
HCT VFR BLD CALC: 42.9 % — SIGNIFICANT CHANGE UP (ref 39–50)
HGB BLD-MCNC: 14.2 G/DL — SIGNIFICANT CHANGE UP (ref 13–17)
LYMPHOCYTES # BLD AUTO: 0.6 K/UL — LOW (ref 1–3.3)
LYMPHOCYTES # BLD AUTO: 4.8 % — LOW (ref 13–44)
MCHC RBC-ENTMCNC: 28.6 PG — SIGNIFICANT CHANGE UP (ref 27–34)
MCHC RBC-ENTMCNC: 33.1 GM/DL — SIGNIFICANT CHANGE UP (ref 32–36)
MCV RBC AUTO: 86.3 FL — SIGNIFICANT CHANGE UP (ref 80–100)
MONOCYTES # BLD AUTO: 0.6 K/UL — SIGNIFICANT CHANGE UP (ref 0–0.9)
MONOCYTES NFR BLD AUTO: 4.3 % — SIGNIFICANT CHANGE UP (ref 2–14)
NEUTROPHILS # BLD AUTO: 12 K/UL — HIGH (ref 1.8–7.4)
NEUTROPHILS NFR BLD AUTO: 90.5 % — HIGH (ref 43–77)
PLATELET # BLD AUTO: 150 K/UL — SIGNIFICANT CHANGE UP (ref 150–400)
POTASSIUM SERPL-MCNC: 4 MMOL/L — SIGNIFICANT CHANGE UP (ref 3.5–5.3)
POTASSIUM SERPL-SCNC: 4 MMOL/L — SIGNIFICANT CHANGE UP (ref 3.5–5.3)
PROT SERPL-MCNC: 7.7 G/DL — SIGNIFICANT CHANGE UP (ref 6–8.3)
RBC # BLD: 4.97 M/UL — SIGNIFICANT CHANGE UP (ref 4.2–5.8)
RBC # FLD: 12.5 % — SIGNIFICANT CHANGE UP (ref 10.3–14.5)
SODIUM SERPL-SCNC: 138 MMOL/L — SIGNIFICANT CHANGE UP (ref 135–145)
WBC # BLD: 13.2 K/UL — HIGH (ref 3.8–10.5)
WBC # FLD AUTO: 13.2 K/UL — HIGH (ref 3.8–10.5)

## 2018-08-30 PROCEDURE — 80053 COMPREHEN METABOLIC PANEL: CPT

## 2018-08-30 PROCEDURE — 70498 CT ANGIOGRAPHY NECK: CPT

## 2018-08-30 PROCEDURE — 70496 CT ANGIOGRAPHY HEAD: CPT | Mod: 26

## 2018-08-30 PROCEDURE — 70498 CT ANGIOGRAPHY NECK: CPT | Mod: 26

## 2018-08-30 PROCEDURE — 99284 EMERGENCY DEPT VISIT MOD MDM: CPT | Mod: 25

## 2018-08-30 PROCEDURE — 85027 COMPLETE CBC AUTOMATED: CPT

## 2018-08-30 PROCEDURE — 99284 EMERGENCY DEPT VISIT MOD MDM: CPT

## 2018-08-30 PROCEDURE — 70496 CT ANGIOGRAPHY HEAD: CPT

## 2018-08-30 PROCEDURE — 99243 OFF/OP CNSLTJ NEW/EST LOW 30: CPT

## 2018-08-30 RX ORDER — DIAZEPAM 5 MG
5 TABLET ORAL ONCE
Qty: 0 | Refills: 0 | Status: DISCONTINUED | OUTPATIENT
Start: 2018-08-30 | End: 2018-08-30

## 2018-08-30 RX ORDER — OXYCODONE AND ACETAMINOPHEN 5; 325 MG/1; MG/1
1 TABLET ORAL ONCE
Qty: 0 | Refills: 0 | Status: DISCONTINUED | OUTPATIENT
Start: 2018-08-30 | End: 2018-08-30

## 2018-08-30 RX ORDER — DIAZEPAM 5 MG
1 TABLET ORAL
Qty: 7 | Refills: 0 | OUTPATIENT
Start: 2018-08-30 | End: 2018-09-05

## 2018-08-30 RX ADMIN — OXYCODONE AND ACETAMINOPHEN 1 TABLET(S): 5; 325 TABLET ORAL at 12:15

## 2018-08-30 RX ADMIN — Medication 5 MILLIGRAM(S): at 11:45

## 2018-08-30 RX ADMIN — OXYCODONE AND ACETAMINOPHEN 1 TABLET(S): 5; 325 TABLET ORAL at 11:45

## 2018-08-30 NOTE — ED PROVIDER NOTE - OBJECTIVE STATEMENT
57 year old male with pmhx of hypothyroidism and osteoporosis and arthritis and pshx of right hip replacement presents with worsening b/l shoulder pain that is radiating across neck associated with throat and jaw pain x3 days ago after flexing his neck and swallowing. Patient also states worsening weakness in right hand. Denies dizziness, nausea, vomiting. No vision changes or fever. Per family, + cold sweats from pain.  Patient takes 600mg of ibuprofen, steroids and 5 mg of Valium with no relief. Non smoker or etoh user. Patient was seen and had recent negative imaging done at [place] on 8/28/18. 57 year old male with pmhx of hypothyroidism and osteoporosis and arthritis and pshx of right hip replacement presents with worsening b/l shoulder pain that is radiating across neck associated with throat and jaw pain. Symptoms started 3 days ago after flexing his neck and swallowing. Patient went to [facility] and was evaluated, had a negative CTA Chest and abdomen and was discharged. He states he has so much pain and is also worried about the weakness in his hand. Denies dizziness, nausea, vomiting. No vision changes or fever. No change in gait. No weakness in lower extremities. Per family, + cold sweats from pain.  Patient takes 600mg of ibuprofen, steroids and 5 mg of Valium with no relief. Non smoker.

## 2018-08-30 NOTE — ED ADULT NURSE NOTE - NSIMPLEMENTINTERV_GEN_ALL_ED
Implemented All Fall with Harm Risk Interventions:  [place] to call system. Call bell, personal items and telephone within reach. Instruct patient to call for assistance. Room bathroom lighting operational. Non-slip footwear when patient is off stretcher. Physically safe environment: no spills, clutter or unnecessary equipment. Stretcher in lowest position, wheels locked, appropriate side rails in place. Provide visual cue, wrist band, yellow gown, etc. Monitor gait and stability. Monitor for mental status changes and reorient to person, place, and time. Review medications for side effects contributing to fall risk. Reinforce activity limits and safety measures with patient and family. Provide visual clues: red socks.

## 2018-08-30 NOTE — CONSULT NOTE ADULT - SUBJECTIVE AND OBJECTIVE BOX
HPI:  58 yo M with PMHx of hypothyroidism, osteoporosis, right hip replacement, right sided siatica presents with bilateral shoulder pain. The patient notes that for the past three months he has had "something in my throat" which he says interferes with his swallowing. Within the past few days he performed a maneuver to clear this lump and he explained that this maneuver "made it much worse". Since this time he has had dull pain ("15/10", dull) spreading down his right arm and within the past day down his left arm. He has tried treating the pain with NSAIDs and received morphine at [facility] two days ago for the same complaint, both of which did not help as per patient. He notes the only thing that has relieved the pain was prednisone given at [facility]. The wife also notes that Xanax prescribed at [facility] alleviated the pain as well. Of note, the pain is accompanied by weakness in his right hand muscles and his right wrist. The patient denies any loss of sensation in either arm, denies any change in bowl / urinary habits.       Neuro exam:    MSE  Alert to person, place and date  Concentration: Months of the year forward and backward.   Recall: 3/3 @ 5 minutes     CNs:   II: pupils reactive to light.   III/IV/VI: Equal range of motion in all directions BL  V: sensation equal and intact BL  VII: Smile symmetric, eyebrow raise symmetric   VIII: intact bilateral  IX/X: Uvula deviated to the left.  XI: SCM 5/5 BL  XII: tongue midline    Motor:   Normal bulk. No fasciculations   No drift  Rapid finger tap normal  Right arm: Should flexion / extension 5/5. Elbow flexion / extension 5/5. Wrist extension 5/5. Wrist flexion 4+/5. Interossei 4-/5. thumb opposition 4+/5  Left arm 5/5 across all joints and within the hand.   Lower extremities 5/5 BL   tone nonspastic BL UE and LE.      Reflexes:  R bicep: 1+   L bicep:  2+  R tricep: 0  L tricep: 0  R Brachradi: 0  L brachradi: 0     Lower extremities   Right pat: 1+  Right achilles: 0  Left pat: 2+  left achilles: 0  Babinski downgoing BL    Coordination   finger to nose normal BL    Sensory:  Right arm:  normal sensation across dermatomes to light touch and temperature    Left arm; normal sensation across dermatomes to light touch and temperature    Lower extremities: Normal sensation to light touch BL

## 2018-08-30 NOTE — CONSULT NOTE ADULT - ASSESSMENT
58 yo M with PMHx of osteoporosis, hypothyroidism, right hip replacement c/b right-sided sciatica, who presented with bilateral shoulder pain and weakness in his right arm. Given the patients acute worsening in the past three days following this unidentified "swelling" in his neck, the lesion might localize to a peripheral nerve. The patient's neuro exam supports this and was significant for decreased reflexes in his right bicep, and significant weakness in his right sided interossei. Possible lesions include a brachial plexus injury vs. radiculopathy. Will discuss with attending and advise workup.

## 2018-08-30 NOTE — ED PROVIDER NOTE - PMH
ADHD (attention deficit hyperactivity disorder)    Hypothyroid    Hypothyroid    Lumbar disc herniation  L4-5, intermittent right lower leg tingling  Muscle atrophy  right leg  Osteoarthritis

## 2018-08-30 NOTE — ED PROVIDER NOTE - MEDICAL DECISION MAKING DETAILS
57 year old male with hx of arthritis here for b/l shoulder pain and weakness in right hand vs. left. Reports 3 days ago after flexing his neck and swallowing. Negative workup including aortic dissection study x2 days ago. Patient here for worsening symptoms and progressive weakness. R/o radiculopathy, vertebral artery dissection, and reassess. Will get labs, CT head and neck, and give pain control. 57 year old male with hx of arthritis here for b/l shoulder pain and weakness in right hand vs. left. Reports 3 days ago after flexing his neck and swallowing. Negative workup including aortic dissection study x2 days ago. Patient here for worsening symptoms and progressive weakness. R/o radiculopathy, less likely vertebral artery dissection. Will get labs, CT head and neck, and give pain control. Will consult neuro.

## 2018-08-30 NOTE — ED PROVIDER NOTE - CHIEF COMPLAINT
The patient is a 57y Male complaining of The patient is a 57y Male complaining of weakness in right hand and shoulder pain.

## 2018-08-30 NOTE — ED ADULT NURSE REASSESSMENT NOTE - NS ED NURSE REASSESS COMMENT FT1
Neuro consulted pt. States pt able to receive follow up and MRI outpatient. Pt discharged home to follow up with neurology for MRI. VSS with family at bedside

## 2018-08-30 NOTE — ED ADULT NURSE NOTE - OBJECTIVE STATEMENT
57y male c/o b/l shoulder pain. A&Ox3 and VSS. Hx of hypothyroidism and osteoperosis. Pt reports throat pain/"lump in throat" 4 months ago. 3 days ago, pt bent neck forward and tried to swallow to "move the lump" causing pain in right shoulder. Pain worsened and radiated down right arm with numbness so pt went to [facility]. CT and xray negative. Discharged on prednisone and xanax. Pt states pain has worsened and now traveled to left shoulder. Pt now c/o b/l shoulder pain 9/10 radiating down arms. Family states pt has been dropping objects and has poor movement in hands. 57y male c/o b/l shoulder pain. A&Ox3 and VSS. Hx of hypothyroidism and osteoperosis. Pt reports throat pain/"lump in throat" 4 months ago. 3 days ago, pt bent neck forward and tried to swallow to "move the lump" causing pain in right shoulder. Pain worsened and radiated down right arm with numbness so pt went to [facility]. CT and xray negative. Discharged on prednisone and xanax. Pt states pain has worsened and now traveled to left shoulder. Pt now c/o b/l shoulder pain 9/10 radiating down b/l arms. Denies trauma to area. Pt reports decrease ROM in right hand and family states pt has been dropping objects. Denies chest pain, sob, fever/chills, n/v/d. PERRLA 3mm, face symmetrical, sensation intact, decreased strength in right arm.

## 2018-08-30 NOTE — ED PROVIDER NOTE - NS_ ATTENDINGSCRIBEDETAILS _ED_A_ED_FT
I,Anna Moran, performed the initial face to face bedside interview with this patient regarding history of present illness, review of symptoms and relevant past medical, social and family history.  I completed an independent physical examination.  I was the initial provider who evaluated this patient. The history, relevant review of systems, past medical and surgical history, medical decision making, and physical examination was documented by the scribe in my presence and I attest to the accuracy of the documentation.

## 2018-08-30 NOTE — ED PROVIDER NOTE - PROGRESS NOTE DETAILS
Neuro at bedside. Patient has been updated on CT scan results and neuro evaluation. Pt. seen by Neuro at bedside, cleared for discharge and outpatient follow up. Instructed to follow up for outpatient MRI. Dean HAMILTON: Neuro at bedside. Patient has been updated on CT scan results and neuro evaluation. Dean HAMILTON: At time of discharge, I discussed all results in detail, need for neurology follow up and return instructions. Patient understands he must follow up with neurology, return to ED for worsening weakness, new symptoms of concern.

## 2018-09-07 ENCOUNTER — APPOINTMENT (OUTPATIENT)
Dept: NEUROSURGERY | Facility: CLINIC | Age: 58
End: 2018-09-07
Payer: COMMERCIAL

## 2018-09-07 VITALS
SYSTOLIC BLOOD PRESSURE: 138 MMHG | RESPIRATION RATE: 16 BRPM | DIASTOLIC BLOOD PRESSURE: 81 MMHG | HEART RATE: 85 BPM | HEIGHT: 71 IN

## 2018-09-07 DIAGNOSIS — M54.5 LOW BACK PAIN: ICD-10-CM

## 2018-09-07 DIAGNOSIS — K44.9 DIAPHRAGMATIC HERNIA W/OUT OBSTRUCTION OR GANGRENE: ICD-10-CM

## 2018-09-07 PROCEDURE — 99204 OFFICE O/P NEW MOD 45 MIN: CPT

## 2018-09-07 RX ORDER — DEXTROAMPHETAMINE SACCHARATE, AMPHETAMINE ASPARTATE MONOHYDRATE, DEXTROAMPHETAMINE SULFATE AND AMPHETAMINE SULFATE 5; 5; 5; 5 MG/1; MG/1; MG/1; MG/1
20 CAPSULE, EXTENDED RELEASE ORAL
Qty: 60 | Refills: 0 | Status: DISCONTINUED | COMMUNITY
Start: 2017-07-22 | End: 2018-09-07

## 2018-09-07 RX ORDER — ACETAMINOPHEN 325 MG/1
TABLET, FILM COATED ORAL
Refills: 0 | Status: DISCONTINUED | COMMUNITY
End: 2018-09-07

## 2018-09-07 RX ORDER — CELECOXIB 200 MG/1
200 CAPSULE ORAL DAILY
Qty: 30 | Refills: 1 | Status: DISCONTINUED | COMMUNITY
Start: 2017-10-24 | End: 2018-09-07

## 2018-09-07 RX ORDER — AMOXICILLIN 500 MG/1
500 CAPSULE ORAL
Qty: 20 | Refills: 4 | Status: DISCONTINUED | COMMUNITY
Start: 2017-02-01 | End: 2018-09-07

## 2018-09-07 RX ORDER — CELECOXIB 200 MG/1
200 CAPSULE ORAL DAILY
Qty: 30 | Refills: 0 | Status: DISCONTINUED | COMMUNITY
Start: 2018-01-09 | End: 2018-09-07

## 2018-09-18 ENCOUNTER — FORM ENCOUNTER (OUTPATIENT)
Age: 58
End: 2018-09-18

## 2018-09-19 ENCOUNTER — RX RENEWAL (OUTPATIENT)
Age: 58
End: 2018-09-19

## 2018-09-19 ENCOUNTER — OUTPATIENT (OUTPATIENT)
Dept: OUTPATIENT SERVICES | Facility: HOSPITAL | Age: 58
LOS: 1 days | Discharge: HOME | End: 2018-09-19
Payer: COMMERCIAL

## 2018-09-19 DIAGNOSIS — M54.5 LOW BACK PAIN: ICD-10-CM

## 2018-09-19 DIAGNOSIS — M54.2 CERVICALGIA: ICD-10-CM

## 2018-09-19 DIAGNOSIS — R13.10 DYSPHAGIA, UNSPECIFIED: ICD-10-CM

## 2018-09-19 DIAGNOSIS — M43.20 FUSION OF SPINE, SITE UNSPECIFIED: ICD-10-CM

## 2018-09-19 DIAGNOSIS — Z90.89 ACQUIRED ABSENCE OF OTHER ORGANS: Chronic | ICD-10-CM

## 2018-09-19 PROCEDURE — 74220 X-RAY XM ESOPHAGUS 1CNTRST: CPT | Mod: 26

## 2018-10-04 ENCOUNTER — APPOINTMENT (OUTPATIENT)
Dept: NEUROLOGY | Facility: CLINIC | Age: 58
End: 2018-10-04
Payer: COMMERCIAL

## 2018-10-04 PROCEDURE — 95886 MUSC TEST DONE W/N TEST COMP: CPT

## 2018-10-04 PROCEDURE — 95910 NRV CNDJ TEST 7-8 STUDIES: CPT

## 2018-10-08 ENCOUNTER — APPOINTMENT (OUTPATIENT)
Dept: NEUROSURGERY | Facility: CLINIC | Age: 58
End: 2018-10-08
Payer: COMMERCIAL

## 2018-10-08 VITALS
HEART RATE: 81 BPM | RESPIRATION RATE: 16 BRPM | DIASTOLIC BLOOD PRESSURE: 80 MMHG | SYSTOLIC BLOOD PRESSURE: 143 MMHG | HEIGHT: 71 IN | WEIGHT: 227 LBS | BODY MASS INDEX: 31.78 KG/M2

## 2018-10-08 DIAGNOSIS — G56.03 CARPAL TUNNEL SYNDROM,BILATERAL UPPER LIMBS: ICD-10-CM

## 2018-10-08 PROCEDURE — 99214 OFFICE O/P EST MOD 30 MIN: CPT

## 2018-10-14 PROBLEM — G56.03 CARPAL TUNNEL SYNDROME ON BOTH SIDES: Status: ACTIVE | Noted: 2018-10-14

## 2018-11-05 ENCOUNTER — APPOINTMENT (OUTPATIENT)
Dept: THORACIC SURGERY | Facility: CLINIC | Age: 58
End: 2018-11-05
Payer: COMMERCIAL

## 2018-11-05 VITALS
WEIGHT: 227 LBS | TEMPERATURE: 98 F | HEART RATE: 64 BPM | HEIGHT: 71 IN | SYSTOLIC BLOOD PRESSURE: 135 MMHG | OXYGEN SATURATION: 100 % | DIASTOLIC BLOOD PRESSURE: 86 MMHG | BODY MASS INDEX: 31.78 KG/M2 | RESPIRATION RATE: 18 BRPM

## 2018-11-05 PROCEDURE — 99204 OFFICE O/P NEW MOD 45 MIN: CPT

## 2018-12-18 ENCOUNTER — OUTPATIENT (OUTPATIENT)
Dept: OUTPATIENT SERVICES | Facility: HOSPITAL | Age: 58
LOS: 1 days | Discharge: HOME | End: 2018-12-18
Payer: COMMERCIAL

## 2018-12-18 VITALS
DIASTOLIC BLOOD PRESSURE: 79 MMHG | HEART RATE: 56 BPM | OXYGEN SATURATION: 100 % | TEMPERATURE: 97 F | WEIGHT: 223.99 LBS | HEIGHT: 71 IN | SYSTOLIC BLOOD PRESSURE: 138 MMHG | RESPIRATION RATE: 16 BRPM

## 2018-12-18 DIAGNOSIS — M43.20 FUSION OF SPINE, SITE UNSPECIFIED: ICD-10-CM

## 2018-12-18 DIAGNOSIS — Z90.89 ACQUIRED ABSENCE OF OTHER ORGANS: Chronic | ICD-10-CM

## 2018-12-18 DIAGNOSIS — Z96.641 PRESENCE OF RIGHT ARTIFICIAL HIP JOINT: Chronic | ICD-10-CM

## 2018-12-18 DIAGNOSIS — Z98.890 OTHER SPECIFIED POSTPROCEDURAL STATES: Chronic | ICD-10-CM

## 2018-12-18 DIAGNOSIS — R13.10 DYSPHAGIA, UNSPECIFIED: ICD-10-CM

## 2018-12-18 LAB
ANION GAP SERPL CALC-SCNC: 6 MMOL/L — SIGNIFICANT CHANGE UP (ref 5–17)
APPEARANCE UR: CLEAR — SIGNIFICANT CHANGE UP
APTT BLD: 32 SEC — SIGNIFICANT CHANGE UP (ref 27.5–36.3)
BACTERIA # UR AUTO: ABNORMAL
BASOPHILS # BLD AUTO: 0.02 K/UL — SIGNIFICANT CHANGE UP (ref 0–0.2)
BASOPHILS NFR BLD AUTO: 0.4 % — SIGNIFICANT CHANGE UP (ref 0–2)
BILIRUB UR-MCNC: NEGATIVE — SIGNIFICANT CHANGE UP
BLD GP AB SCN SERPL QL: SIGNIFICANT CHANGE UP
BUN SERPL-MCNC: 11 MG/DL — SIGNIFICANT CHANGE UP (ref 7–23)
CALCIUM SERPL-MCNC: 8.4 MG/DL — LOW (ref 8.5–10.1)
CHLORIDE SERPL-SCNC: 107 MMOL/L — SIGNIFICANT CHANGE UP (ref 96–108)
CO2 SERPL-SCNC: 28 MMOL/L — SIGNIFICANT CHANGE UP (ref 22–31)
COLOR SPEC: YELLOW — SIGNIFICANT CHANGE UP
COMMENT - URINE: SIGNIFICANT CHANGE UP
CREAT SERPL-MCNC: 0.94 MG/DL — SIGNIFICANT CHANGE UP (ref 0.5–1.3)
DIFF PNL FLD: NEGATIVE — SIGNIFICANT CHANGE UP
EOSINOPHIL # BLD AUTO: 0.13 K/UL — SIGNIFICANT CHANGE UP (ref 0–0.5)
EOSINOPHIL NFR BLD AUTO: 2.9 % — SIGNIFICANT CHANGE UP (ref 0–6)
EPI CELLS # UR: NEGATIVE — SIGNIFICANT CHANGE UP
GLUCOSE SERPL-MCNC: 89 MG/DL — SIGNIFICANT CHANGE UP (ref 70–99)
GLUCOSE UR QL: NEGATIVE MG/DL — SIGNIFICANT CHANGE UP
HCT VFR BLD CALC: 45.3 % — SIGNIFICANT CHANGE UP (ref 39–50)
HGB BLD-MCNC: 15.1 G/DL — SIGNIFICANT CHANGE UP (ref 13–17)
IMM GRANULOCYTES NFR BLD AUTO: 0.4 % — SIGNIFICANT CHANGE UP (ref 0–1.5)
INR BLD: 0.95 RATIO — SIGNIFICANT CHANGE UP (ref 0.88–1.16)
KETONES UR-MCNC: NEGATIVE — SIGNIFICANT CHANGE UP
LEUKOCYTE ESTERASE UR-ACNC: NEGATIVE — SIGNIFICANT CHANGE UP
LYMPHOCYTES # BLD AUTO: 1.26 K/UL — SIGNIFICANT CHANGE UP (ref 1–3.3)
LYMPHOCYTES # BLD AUTO: 27.7 % — SIGNIFICANT CHANGE UP (ref 13–44)
MCHC RBC-ENTMCNC: 28.6 PG — SIGNIFICANT CHANGE UP (ref 27–34)
MCHC RBC-ENTMCNC: 33.3 GM/DL — SIGNIFICANT CHANGE UP (ref 32–36)
MCV RBC AUTO: 85.8 FL — SIGNIFICANT CHANGE UP (ref 80–100)
MONOCYTES # BLD AUTO: 0.44 K/UL — SIGNIFICANT CHANGE UP (ref 0–0.9)
MONOCYTES NFR BLD AUTO: 9.7 % — SIGNIFICANT CHANGE UP (ref 2–14)
MRSA PCR RESULT.: SIGNIFICANT CHANGE UP
NEUTROPHILS # BLD AUTO: 2.68 K/UL — SIGNIFICANT CHANGE UP (ref 1.8–7.4)
NEUTROPHILS NFR BLD AUTO: 58.9 % — SIGNIFICANT CHANGE UP (ref 43–77)
NITRITE UR-MCNC: NEGATIVE — SIGNIFICANT CHANGE UP
NRBC # BLD: 0 /100 WBCS — SIGNIFICANT CHANGE UP (ref 0–0)
PH UR: 5 — SIGNIFICANT CHANGE UP (ref 5–8)
PLATELET # BLD AUTO: 152 K/UL — SIGNIFICANT CHANGE UP (ref 150–400)
POTASSIUM SERPL-MCNC: 4.2 MMOL/L — SIGNIFICANT CHANGE UP (ref 3.5–5.3)
POTASSIUM SERPL-SCNC: 4.2 MMOL/L — SIGNIFICANT CHANGE UP (ref 3.5–5.3)
PROT UR-MCNC: 15 MG/DL
PROTHROM AB SERPL-ACNC: 10.5 SEC — SIGNIFICANT CHANGE UP (ref 10–12.9)
RBC # BLD: 5.28 M/UL — SIGNIFICANT CHANGE UP (ref 4.2–5.8)
RBC # FLD: 13.1 % — SIGNIFICANT CHANGE UP (ref 10.3–14.5)
RBC CASTS # UR COMP ASSIST: SIGNIFICANT CHANGE UP /HPF (ref 0–4)
S AUREUS DNA NOSE QL NAA+PROBE: SIGNIFICANT CHANGE UP
SODIUM SERPL-SCNC: 141 MMOL/L — SIGNIFICANT CHANGE UP (ref 135–145)
SP GR SPEC: 1.02 — SIGNIFICANT CHANGE UP (ref 1.01–1.02)
TYPE + AB SCN PNL BLD: SIGNIFICANT CHANGE UP
UROBILINOGEN FLD QL: NEGATIVE MG/DL — SIGNIFICANT CHANGE UP
WBC # BLD: 4.55 K/UL — SIGNIFICANT CHANGE UP (ref 3.8–10.5)
WBC # FLD AUTO: 4.55 K/UL — SIGNIFICANT CHANGE UP (ref 3.8–10.5)
WBC UR QL: SIGNIFICANT CHANGE UP

## 2018-12-18 PROCEDURE — 93010 ELECTROCARDIOGRAM REPORT: CPT

## 2018-12-18 RX ORDER — LEVOTHYROXINE SODIUM 125 MCG
0 TABLET ORAL
Qty: 0 | Refills: 0 | COMMUNITY

## 2018-12-18 NOTE — H&P PST ADULT - PSH
H/O colonoscopy  removal of polyp  History of hip replacement, total, right  2018  S/P T&A (status post tonsillectomy and adenoidectomy)  as child

## 2018-12-18 NOTE — H&P PST ADULT - ASSESSMENT
58 year old male  presents to PST for decompression removal of anterior cervical osteophytes  C2-C3 and C3-C4    Plan:  1. PST instructions given ; NPO post midnight   2. Pt instructed to take following meds with sip of water : levothyroxine   3. EZ wash instructions given & mupirocin instructions given  4. Medical Evaluation with Dr Robison

## 2018-12-18 NOTE — H&P PST ADULT - HISTORY OF PRESENT ILLNESS
58 year old male PMH of ADD, hypothryoid, lung nodule, thoracic lymphadenopathy 58 year old male PMH of ADHD, hypothyroid, lung nodule, thoracic lymphadenopathy, polyp colon, OA, atrophy right leg, back pain ; c/o neck pain, limited cervical ROM and choking work up done showed cervical ankylosis; he presents to [facility] for decompression removal of anterior cervical osteophytes  C2-C3 and C3-C4

## 2018-12-20 ENCOUNTER — FORM ENCOUNTER (OUTPATIENT)
Age: 58
End: 2018-12-20

## 2018-12-21 ENCOUNTER — OUTPATIENT (OUTPATIENT)
Dept: OUTPATIENT SERVICES | Facility: HOSPITAL | Age: 58
LOS: 1 days | Discharge: HOME | End: 2018-12-21
Payer: COMMERCIAL

## 2018-12-21 ENCOUNTER — APPOINTMENT (OUTPATIENT)
Dept: NEUROSURGERY | Facility: HOSPITAL | Age: 58
End: 2018-12-21
Payer: COMMERCIAL

## 2018-12-21 VITALS
OXYGEN SATURATION: 98 % | WEIGHT: 223.99 LBS | TEMPERATURE: 98 F | RESPIRATION RATE: 17 BRPM | DIASTOLIC BLOOD PRESSURE: 87 MMHG | HEIGHT: 71 IN | SYSTOLIC BLOOD PRESSURE: 151 MMHG | HEART RATE: 61 BPM

## 2018-12-21 DIAGNOSIS — Z96.641 PRESENCE OF RIGHT ARTIFICIAL HIP JOINT: Chronic | ICD-10-CM

## 2018-12-21 DIAGNOSIS — Z90.89 ACQUIRED ABSENCE OF OTHER ORGANS: Chronic | ICD-10-CM

## 2018-12-21 DIAGNOSIS — Z98.890 OTHER SPECIFIED POSTPROCEDURAL STATES: Chronic | ICD-10-CM

## 2018-12-21 PROCEDURE — 22110 REMOVE PART OF NECK VERTEBRA: CPT

## 2018-12-21 PROCEDURE — 72040 X-RAY EXAM NECK SPINE 2-3 VW: CPT | Mod: 26

## 2018-12-21 PROCEDURE — 22116 REMOVE EXTRA SPINE SEGMENT: CPT

## 2018-12-21 RX ORDER — METOCLOPRAMIDE HCL 10 MG
10 TABLET ORAL ONCE
Qty: 0 | Refills: 0 | Status: DISCONTINUED | OUTPATIENT
Start: 2018-12-21 | End: 2018-12-22

## 2018-12-21 RX ORDER — ONDANSETRON 8 MG/1
4 TABLET, FILM COATED ORAL EVERY 6 HOURS
Qty: 0 | Refills: 0 | Status: DISCONTINUED | OUTPATIENT
Start: 2018-12-21 | End: 2018-12-22

## 2018-12-21 RX ORDER — CYCLOBENZAPRINE HYDROCHLORIDE 10 MG/1
10 TABLET, FILM COATED ORAL EVERY 8 HOURS
Qty: 0 | Refills: 0 | Status: DISCONTINUED | OUTPATIENT
Start: 2018-12-21 | End: 2018-12-22

## 2018-12-21 RX ORDER — SODIUM CHLORIDE 9 MG/ML
1000 INJECTION, SOLUTION INTRAVENOUS
Qty: 0 | Refills: 0 | Status: DISCONTINUED | OUTPATIENT
Start: 2018-12-21 | End: 2018-12-21

## 2018-12-21 RX ORDER — ACETAMINOPHEN 500 MG
1000 TABLET ORAL EVERY 6 HOURS
Qty: 0 | Refills: 0 | Status: COMPLETED | OUTPATIENT
Start: 2018-12-21 | End: 2018-12-21

## 2018-12-21 RX ORDER — ONDANSETRON 8 MG/1
4 TABLET, FILM COATED ORAL ONCE
Qty: 0 | Refills: 0 | Status: DISCONTINUED | OUTPATIENT
Start: 2018-12-21 | End: 2018-12-21

## 2018-12-21 RX ORDER — OXYCODONE HYDROCHLORIDE 5 MG/1
5 TABLET ORAL ONCE
Qty: 0 | Refills: 0 | Status: DISCONTINUED | OUTPATIENT
Start: 2018-12-21 | End: 2018-12-21

## 2018-12-21 RX ORDER — LEVOTHYROXINE SODIUM 125 MCG
175 TABLET ORAL DAILY
Qty: 0 | Refills: 0 | Status: DISCONTINUED | OUTPATIENT
Start: 2018-12-21 | End: 2018-12-22

## 2018-12-21 RX ORDER — OXYCODONE HYDROCHLORIDE 5 MG/1
10 TABLET ORAL EVERY 6 HOURS
Qty: 0 | Refills: 0 | Status: DISCONTINUED | OUTPATIENT
Start: 2018-12-21 | End: 2018-12-22

## 2018-12-21 RX ORDER — SENNA PLUS 8.6 MG/1
2 TABLET ORAL AT BEDTIME
Qty: 0 | Refills: 0 | Status: DISCONTINUED | OUTPATIENT
Start: 2018-12-21 | End: 2018-12-22

## 2018-12-21 RX ORDER — SODIUM CHLORIDE 9 MG/ML
1000 INJECTION INTRAMUSCULAR; INTRAVENOUS; SUBCUTANEOUS
Qty: 0 | Refills: 0 | Status: DISCONTINUED | OUTPATIENT
Start: 2018-12-21 | End: 2018-12-22

## 2018-12-21 RX ORDER — HYDROMORPHONE HYDROCHLORIDE 2 MG/ML
0.5 INJECTION INTRAMUSCULAR; INTRAVENOUS; SUBCUTANEOUS
Qty: 0 | Refills: 0 | Status: DISCONTINUED | OUTPATIENT
Start: 2018-12-21 | End: 2018-12-21

## 2018-12-21 RX ORDER — DOCUSATE SODIUM 100 MG
100 CAPSULE ORAL THREE TIMES A DAY
Qty: 0 | Refills: 0 | Status: DISCONTINUED | OUTPATIENT
Start: 2018-12-21 | End: 2018-12-22

## 2018-12-21 RX ORDER — ACETAMINOPHEN 500 MG
1000 TABLET ORAL ONCE
Qty: 0 | Refills: 0 | Status: COMPLETED | OUTPATIENT
Start: 2018-12-21 | End: 2018-12-21

## 2018-12-21 RX ORDER — OXYCODONE HYDROCHLORIDE 5 MG/1
5 TABLET ORAL EVERY 4 HOURS
Qty: 0 | Refills: 0 | Status: DISCONTINUED | OUTPATIENT
Start: 2018-12-21 | End: 2018-12-22

## 2018-12-21 RX ADMIN — OXYCODONE HYDROCHLORIDE 10 MILLIGRAM(S): 5 TABLET ORAL at 20:45

## 2018-12-21 RX ADMIN — Medication 100 MILLIGRAM(S): at 20:19

## 2018-12-21 RX ADMIN — Medication 400 MILLIGRAM(S): at 18:17

## 2018-12-21 RX ADMIN — SENNA PLUS 2 TABLET(S): 8.6 TABLET ORAL at 20:19

## 2018-12-21 RX ADMIN — SODIUM CHLORIDE 75 MILLILITER(S): 9 INJECTION INTRAMUSCULAR; INTRAVENOUS; SUBCUTANEOUS at 12:19

## 2018-12-21 RX ADMIN — SODIUM CHLORIDE 75 MILLILITER(S): 9 INJECTION, SOLUTION INTRAVENOUS at 11:09

## 2018-12-21 RX ADMIN — Medication 400 MILLIGRAM(S): at 11:09

## 2018-12-21 RX ADMIN — OXYCODONE HYDROCHLORIDE 10 MILLIGRAM(S): 5 TABLET ORAL at 20:15

## 2018-12-21 RX ADMIN — Medication 1000 MILLIGRAM(S): at 12:15

## 2018-12-21 RX ADMIN — CYCLOBENZAPRINE HYDROCHLORIDE 10 MILLIGRAM(S): 10 TABLET, FILM COATED ORAL at 20:18

## 2018-12-21 RX ADMIN — Medication 1000 MILLIGRAM(S): at 18:30

## 2018-12-21 NOTE — BRIEF OPERATIVE NOTE - POST-OP DX
Ankylosing spondylitis of cervical region  12/21/2018  large anterior osteophytes with swallowing difficulty C2/3 C3/4  Active  Huber Clifford

## 2018-12-21 NOTE — PROGRESS NOTE ADULT - SUBJECTIVE AND OBJECTIVE BOX
Patient is a 58y old  Male who presents with a chief complaint of     HPI:  pt seen and examined post operatively. Pt is s/p anterior c2-3, C3-4 osteophyte excision. Pt has a history of AS with anterior osteophytes at C2-3 C3-4 causing swallowing difficulties. Pt doing well post op, denies any complaints currently. Pain well controlled.     PAST MEDICAL & SURGICAL HISTORY:  Lymphadenopathy, thoracic  Lung nodule: right lower quadrant ; let lung x 2  Polyp of colon  Hiatal hernia  Ankylosis: cervical  Hypothyroid  ADHD (attention deficit hyperactivity disorder)  Osteoarthritis  Muscle atrophy: right leg  Lumbar disc herniation: L4-5, intermittent right lower leg tingling  H/O colonoscopy: removal of polyp  History of hip replacement, total, right: 2018  S/P T&A (status post tonsillectomy and adenoidectomy): as child      FAMILY HISTORY:  Family history of peripheral vascular disease  Family history of throat cancer      Social Hx:  Nonsmoker, no drug or alcohol use    MEDICATIONS  (STANDING):  docusate sodium 100 milliGRAM(s) Oral three times a day  levothyroxine 175 MICROGram(s) Oral daily  senna 2 Tablet(s) Oral at bedtime  sodium chloride 0.9%. 1000 milliLiter(s) (75 mL/Hr) IV Continuous <Continuous>       Allergies    No Known Allergies    Intolerances        ROS: Pertinent positives in HPI, all other ROS were reviewed and are negative.      Vital Signs Last 24 Hrs  T(C): 36.8 (21 Dec 2018 17:00), Max: 36.8 (21 Dec 2018 07:11)  T(F): 98.2 (21 Dec 2018 17:00), Max: 98.3 (21 Dec 2018 07:11)  HR: 80 (21 Dec 2018 17:00) (61 - 80)  BP: 135/84 (21 Dec 2018 17:00) (109/79 - 151/87)  BP(mean): --  RR: 17 (21 Dec 2018 17:00) (12 - 18)  SpO2: 97% (21 Dec 2018 17:00) (96% - 100%)        Constitutional: awake and alert.  HEENT: PERRLA, EOMI,   Neck: Supple. dressing intact, clean and dry, HV intact   Extremities:  no edema  Musculoskeletal: no joint swelling/tenderness, no abnormal movements  Skin: No rashes    Neurological exam:  HF: A x O x 3. Appropriately interactive, normal affect. Speech fluent, No Aphasia or paraphasic errors.   CN: ANUPAMA, EOMI, VFF, facial sensation normal, no NLFD, tongue midline, Palate moves equally, SCM equal bilaterally  Motor: No pronator drift, Strength 5/5 in all 4 ext,   Sens: Intact to light touch   Gait/Balance: Normal              Labs:

## 2018-12-21 NOTE — ASU PREOP CHECKLIST - TEMPERATURE IN CELSIUS (DEGREES C)
CC:  Chief Complaint   Patient presents with    Fever    Vomiting       HPI: Vin Avery is a 8  y.o. 3  m.o. here for evaluation of fever and vomiting for the last 24hr. he has has associated symptoms of vomiting.  He has had 102 fever. Mom has given otc medication with some response. He complains of stomach ache and has vomited a few times. Dizzy with fever.      Past Medical History   Diagnosis Date    Chronic maxillary sinusitis 11/21/2014    Otitis media          Current Outpatient Prescriptions:     albuterol (PROVENTIL) 2.5 mg /3 mL (0.083 %) nebulizer solution, Take 3 mLs (2.5 mg total) by nebulization every 4 (four) hours as needed for Wheezing or Shortness of Breath., Disp: 90 mL, Rfl: 0    fluticasone (FLONASE) 50 mcg/actuation nasal spray, , Disp: , Rfl: 0    fluticasone (FLONASE) 50 mcg/actuation nasal spray, 1 spray by Each Nare route once daily., Disp: 1 Bottle, Rfl: 1    Review of Systems  Review of Systems   Constitutional: Positive for fever and malaise/fatigue.   HENT: Positive for congestion. Negative for ear discharge, ear pain and sore throat.    Respiratory: Positive for cough (very minimal). Negative for stridor.    Gastrointestinal: Positive for abdominal pain, nausea and vomiting. Negative for constipation and diarrhea.   Neurological: Positive for headaches.   Endo/Heme/Allergies: Positive for environmental allergies.         PE:   Vitals:    01/23/17 0850   BP: (!) 94/57   Pulse: (!) 110   Resp: 20   Temp: 98.9 °F (37.2 °C)       APPEARANCE: Flushed and appears febrile, Cough is audibly very harsh and coarse.  SKIN: Normal skin turgor, no rash noted  EARS: Ears - bilateral TM's and external ear canals normal.   NOSE: Nasal exam - mucosal congestion, mucosal erythema and purulent rhinorrhea.  MOUTH & THROAT: Post nasal drip noted in posterior pharynx. Moist mucous membranes. No tonsillar enlargement. No pharyngeal erythema or exudate. No stridor.   NECK: Supple  CHEST: Lungs clear to  auscultation.  Respirations unlabored., no retractions or wheezes. Cough is coarse and harsh, productive  CARDIOVASCULAR: Regular rate and rhythm without murmur. .  ABDOMEN: Not distended. Soft. Some generalized tenderness more in RLQ.      Tests performed: POCT STREP: NEG  RAPID FLU: Influenza B  CXR: negative  ABD U/S:normal (pt had a large bout of diarrhea interrupting u/s, and felt tremendously better after this.)  CBC: normal cbc with 7.4 WBC      ASSESSMENT:      1. Influenza B     2. Acute febrile illness in pediatric patient  POCT Rapid Strep A    US Abdomen Complete    CBC auto differential    C-reactive protein    X-Ray Chest PA And Lateral    Throat culture    Influenza antigen Nasopharyngeal Swab   3. RLQ abdominal pain  US Abdomen Complete    CBC auto differential    C-reactive protein       PLAN:  Vin MORLEY was seen today for fever and vomiting.    Diagnoses and all orders for this visit:    Influenza B    Acute febrile illness in pediatric patient  -     POCT Rapid Strep A  -     US Abdomen Complete; Future  -     CBC auto differential; Future  -     C-reactive protein; Future  -     X-Ray Chest PA And Lateral; Future  -     Throat culture  -     Influenza antigen Nasopharyngeal Swab    RLQ abdominal pain  -     US Abdomen Complete; Future  -     CBC auto differential; Future  -     C-reactive protein; Future    Reviewed all labs with mom   Albuterol nebs bid to tid. Symptomatic care with zofran prn n/v; electrolyte fluids in small amts frequently.  Soups/crackers/light diet over the next few days.    Call for abx if cough not improved or if increased greenish yellow d/c noted by Thurs/Fri this week.     36.8

## 2018-12-21 NOTE — BRIEF OPERATIVE NOTE - PROCEDURE
<<-----Click on this checkbox to enter Procedure Excision, osteophyte, spine, cervical, anterior approach  12/21/2018  C2/3 and C3/4  Active  [id number]

## 2018-12-21 NOTE — ASU PATIENT PROFILE, ADULT - PMH
ADHD (attention deficit hyperactivity disorder)    Ankylosis  cervical  Hiatal hernia    Hypothyroid    Lumbar disc herniation  L4-5, intermittent right lower leg tingling  Lung nodule  right lower quadrant ; let lung x 2  Lymphadenopathy, thoracic    Muscle atrophy  right leg  Osteoarthritis    Polyp of colon

## 2018-12-22 ENCOUNTER — TRANSCRIPTION ENCOUNTER (OUTPATIENT)
Age: 58
End: 2018-12-22

## 2018-12-22 VITALS
RESPIRATION RATE: 16 BRPM | SYSTOLIC BLOOD PRESSURE: 129 MMHG | OXYGEN SATURATION: 99 % | TEMPERATURE: 98 F | HEART RATE: 59 BPM | DIASTOLIC BLOOD PRESSURE: 70 MMHG

## 2018-12-22 LAB
ANION GAP SERPL CALC-SCNC: 8 MMOL/L — SIGNIFICANT CHANGE UP (ref 5–17)
BASOPHILS # BLD AUTO: 0.02 K/UL — SIGNIFICANT CHANGE UP (ref 0–0.2)
BASOPHILS NFR BLD AUTO: 0.2 % — SIGNIFICANT CHANGE UP (ref 0–2)
BUN SERPL-MCNC: 10 MG/DL — SIGNIFICANT CHANGE UP (ref 7–23)
CALCIUM SERPL-MCNC: 9.1 MG/DL — SIGNIFICANT CHANGE UP (ref 8.5–10.1)
CHLORIDE SERPL-SCNC: 109 MMOL/L — HIGH (ref 96–108)
CO2 SERPL-SCNC: 27 MMOL/L — SIGNIFICANT CHANGE UP (ref 22–31)
CREAT SERPL-MCNC: 0.92 MG/DL — SIGNIFICANT CHANGE UP (ref 0.5–1.3)
EOSINOPHIL # BLD AUTO: 0.06 K/UL — SIGNIFICANT CHANGE UP (ref 0–0.5)
EOSINOPHIL NFR BLD AUTO: 0.7 % — SIGNIFICANT CHANGE UP (ref 0–6)
GLUCOSE SERPL-MCNC: 88 MG/DL — SIGNIFICANT CHANGE UP (ref 70–99)
HCT VFR BLD CALC: 43.7 % — SIGNIFICANT CHANGE UP (ref 39–50)
HGB BLD-MCNC: 14.4 G/DL — SIGNIFICANT CHANGE UP (ref 13–17)
IMM GRANULOCYTES NFR BLD AUTO: 0.5 % — SIGNIFICANT CHANGE UP (ref 0–1.5)
LYMPHOCYTES # BLD AUTO: 1.74 K/UL — SIGNIFICANT CHANGE UP (ref 1–3.3)
LYMPHOCYTES # BLD AUTO: 20.2 % — SIGNIFICANT CHANGE UP (ref 13–44)
MCHC RBC-ENTMCNC: 28.1 PG — SIGNIFICANT CHANGE UP (ref 27–34)
MCHC RBC-ENTMCNC: 33 GM/DL — SIGNIFICANT CHANGE UP (ref 32–36)
MCV RBC AUTO: 85.2 FL — SIGNIFICANT CHANGE UP (ref 80–100)
MONOCYTES # BLD AUTO: 0.67 K/UL — SIGNIFICANT CHANGE UP (ref 0–0.9)
MONOCYTES NFR BLD AUTO: 7.8 % — SIGNIFICANT CHANGE UP (ref 2–14)
NEUTROPHILS # BLD AUTO: 6.08 K/UL — SIGNIFICANT CHANGE UP (ref 1.8–7.4)
NEUTROPHILS NFR BLD AUTO: 70.6 % — SIGNIFICANT CHANGE UP (ref 43–77)
NRBC # BLD: 0 /100 WBCS — SIGNIFICANT CHANGE UP (ref 0–0)
PLATELET # BLD AUTO: 159 K/UL — SIGNIFICANT CHANGE UP (ref 150–400)
POTASSIUM SERPL-MCNC: 4.2 MMOL/L — SIGNIFICANT CHANGE UP (ref 3.5–5.3)
POTASSIUM SERPL-SCNC: 4.2 MMOL/L — SIGNIFICANT CHANGE UP (ref 3.5–5.3)
RBC # BLD: 5.13 M/UL — SIGNIFICANT CHANGE UP (ref 4.2–5.8)
RBC # FLD: 13.1 % — SIGNIFICANT CHANGE UP (ref 10.3–14.5)
SODIUM SERPL-SCNC: 144 MMOL/L — SIGNIFICANT CHANGE UP (ref 135–145)
WBC # BLD: 8.61 K/UL — SIGNIFICANT CHANGE UP (ref 3.8–10.5)
WBC # FLD AUTO: 8.61 K/UL — SIGNIFICANT CHANGE UP (ref 3.8–10.5)

## 2018-12-22 RX ORDER — CYCLOBENZAPRINE HYDROCHLORIDE 10 MG/1
1 TABLET, FILM COATED ORAL
Qty: 30 | Refills: 0
Start: 2018-12-22

## 2018-12-22 RX ORDER — OXYCODONE HYDROCHLORIDE 5 MG/1
1 TABLET ORAL
Qty: 30 | Refills: 0
Start: 2018-12-22

## 2018-12-22 RX ORDER — SENNA PLUS 8.6 MG/1
2 TABLET ORAL
Qty: 0 | Refills: 0 | DISCHARGE
Start: 2018-12-22

## 2018-12-22 RX ADMIN — CYCLOBENZAPRINE HYDROCHLORIDE 10 MILLIGRAM(S): 10 TABLET, FILM COATED ORAL at 07:36

## 2018-12-22 RX ADMIN — Medication 100 MILLIGRAM(S): at 07:36

## 2018-12-22 RX ADMIN — Medication 175 MICROGRAM(S): at 07:59

## 2018-12-22 RX ADMIN — OXYCODONE HYDROCHLORIDE 10 MILLIGRAM(S): 5 TABLET ORAL at 08:03

## 2018-12-22 RX ADMIN — OXYCODONE HYDROCHLORIDE 10 MILLIGRAM(S): 5 TABLET ORAL at 07:36

## 2018-12-22 NOTE — DISCHARGE NOTE ADULT - INSTRUCTIONS
soft diet as tolerated Call MD for any increase in pain, numbness, tingling; fever over 101F; swelling, redness, oozing at incision site; pain in calf.

## 2018-12-22 NOTE — DISCHARGE NOTE ADULT - CARE PROVIDER_API CALL
Huber Clifford; PhD), Neurosurgery  [address] [facility]  [address] [place] [address]  Phone: [phone]  Fax: [phone]

## 2018-12-22 NOTE — PROGRESS NOTE ADULT - ASSESSMENT
57 yo male s/p anterior c2-3, C3-4 osteophyte excision  - advance diet as tolerated   - cont HV   - pain regimen   - oob as tolerated   - seq teds for dvt pph   - possible d/c home tomorrow  - all above d/w Dr Clifford
AP  POD#1 anterior C2-3, 3-4 excision of osteophyte  patient doing well neurologically  patient voice is clear, tolerating soft diet, voiding without penn, tolerating oral pain medications  patient seen & examined with dr Clifford who cleared patient for discharge home today, family at bedside  Drain removed steri strips place & new dressing applied. patient tolerated well  CHERYL LAZO

## 2018-12-22 NOTE — DISCHARGE NOTE ADULT - CARE PLAN
Principal Discharge DX:	ADHD (attention deficit hyperactivity disorder)  Goal:	d/c home  Assessment and plan of treatment:	s/p anterior resection of osteophyte  Secondary Diagnosis:	Hypothyroid

## 2018-12-22 NOTE — DISCHARGE NOTE ADULT - HOSPITAL COURSE
pt admitted for elective surgery. Pt underwent anterior resection of osteophyte. post-operatively pt doing well post op.

## 2018-12-22 NOTE — PROGRESS NOTE ADULT - SUBJECTIVE AND OBJECTIVE BOX
SP anterior C2-3, 3-4 excision of osteophyte 12/21/18  Post op patient did well. No events overnight    patient sitting up in no acute distress  patient notes some mild difficulty swallowing & some posterior neck soreness    Vital Signs Last 24 Hrs  T(C): 36.4 (22 Dec 2018 05:32), Max: 36.8 (21 Dec 2018 17:00)  T(F): 97.6 (22 Dec 2018 05:32), Max: 98.2 (21 Dec 2018 17:00)  HR: 59 (22 Dec 2018 05:32) (59 - 80)  BP: 129/70 (22 Dec 2018 05:32) (127/72 - 142/75)  BP(mean): --  RR: 16 (22 Dec 2018 05:32) (14 - 17)  SpO2: 99% (22 Dec 2018 05:32) (96% - 100%)

## 2018-12-22 NOTE — DISCHARGE NOTE ADULT - MEDICATION SUMMARY - MEDICATIONS TO TAKE
I will START or STAY ON the medications listed below when I get home from the hospital:    oxyCODONE 5 mg oral tablet  -- 1 tab(s) by mouth every 4 hours, As needed, Moderate Pain (4 - 6) MDD:6  -- Indication: For CERVICAL ANKYLOSIS, SWALLOWING DIFFICULTY/CHOKING    Adderall XR 15 mg oral capsule, extended release  -- 1 cap(s) by mouth once a day (in the morning)  -- sometimes skips a day  -- Indication: For CERVICAL ANKYLOSIS, SWALLOWING DIFFICULTY/CHOKING    senna oral tablet  -- 2 tab(s) by mouth once a day (at bedtime)  -- Indication: For CERVICAL ANKYLOSIS, SWALLOWING DIFFICULTY/CHOKING    cyclobenzaprine 10 mg oral tablet  -- 1 tab(s) by mouth every 8 hours, As needed, Muscle Spasm  -- Indication: For CERVICAL ANKYLOSIS, SWALLOWING DIFFICULTY/CHOKING    levothyroxine 175 mcg (0.175 mg) oral tablet  -- 1 tab(s) by mouth once a day  -- Indication: For hypothyroid

## 2018-12-24 NOTE — H&P PST ADULT - PMH
rx was sent again   ADHD (attention deficit hyperactivity disorder)    Hypothyroid    Lumbar disc herniation  L4-5, intermittent right lower leg tingling  Muscle atrophy  right leg  Osteoarthritis

## 2018-12-29 DIAGNOSIS — Z82.49 FAMILY HISTORY OF ISCHEMIC HEART DISEASE AND OTHER DISEASES OF THE CIRCULATORY SYSTEM: ICD-10-CM

## 2018-12-29 DIAGNOSIS — Z80.2 FAMILY HISTORY OF MALIGNANT NEOPLASM OF OTHER RESPIRATORY AND INTRATHORACIC ORGANS: ICD-10-CM

## 2018-12-29 DIAGNOSIS — F90.9 ATTENTION-DEFICIT HYPERACTIVITY DISORDER, UNSPECIFIED TYPE: ICD-10-CM

## 2018-12-29 DIAGNOSIS — E03.9 HYPOTHYROIDISM, UNSPECIFIED: ICD-10-CM

## 2018-12-29 DIAGNOSIS — Z86.010 PERSONAL HISTORY OF COLONIC POLYPS: ICD-10-CM

## 2018-12-29 DIAGNOSIS — M19.90 UNSPECIFIED OSTEOARTHRITIS, UNSPECIFIED SITE: ICD-10-CM

## 2018-12-29 DIAGNOSIS — M45.2 ANKYLOSING SPONDYLITIS OF CERVICAL REGION: ICD-10-CM

## 2018-12-29 DIAGNOSIS — M51.26 OTHER INTERVERTEBRAL DISC DISPLACEMENT, LUMBAR REGION: ICD-10-CM

## 2018-12-29 DIAGNOSIS — M62.561 MUSCLE WASTING AND ATROPHY, NOT ELSEWHERE CLASSIFIED, RIGHT LOWER LEG: ICD-10-CM

## 2018-12-29 DIAGNOSIS — Z96.641 PRESENCE OF RIGHT ARTIFICIAL HIP JOINT: ICD-10-CM

## 2018-12-29 DIAGNOSIS — M25.78 OSTEOPHYTE, VERTEBRAE: ICD-10-CM

## 2018-12-29 DIAGNOSIS — R13.19 OTHER DYSPHAGIA: ICD-10-CM

## 2019-01-03 PROBLEM — R59.0 LOCALIZED ENLARGED LYMPH NODES: Chronic | Status: ACTIVE | Noted: 2018-12-18

## 2019-01-03 PROBLEM — K44.9 DIAPHRAGMATIC HERNIA WITHOUT OBSTRUCTION OR GANGRENE: Chronic | Status: ACTIVE | Noted: 2018-12-18

## 2019-01-03 PROBLEM — M24.60: Chronic | Status: ACTIVE | Noted: 2018-12-18

## 2019-01-03 PROBLEM — R91.1 SOLITARY PULMONARY NODULE: Chronic | Status: ACTIVE | Noted: 2018-12-18

## 2019-01-03 PROBLEM — K63.5 POLYP OF COLON: Chronic | Status: ACTIVE | Noted: 2018-12-18

## 2019-01-04 ENCOUNTER — APPOINTMENT (OUTPATIENT)
Dept: NEUROSURGERY | Facility: CLINIC | Age: 59
End: 2019-01-04
Payer: COMMERCIAL

## 2019-01-04 PROCEDURE — 99024 POSTOP FOLLOW-UP VISIT: CPT

## 2019-01-09 ENCOUNTER — RX RENEWAL (OUTPATIENT)
Age: 59
End: 2019-01-09

## 2019-01-15 NOTE — CONSULT LETTER
[Sincerely,] : Sincerely, [FreeTextEntry1] : Mr. Orion Valladares is a 58 old male who presents today for a postop evaluation. On December 21, 2018, patient underwent an anterior cervical approach with excision of osteophytes at C2-3 and C3-4. As you may recall, patient had ankylosing spondylitis of the cervical region with large anterior osteophytes and swallowing difficulty at C2-3 and C3-4. At this time, patient states he has noticed slight improvement in regards to clearing his throat and coughing. Swallowing difficulties have not changed. Patient denies any changes in voice quality. Patient denies any neck pain or incisional pain. He denies any fever or chills. Patient denies any redness or drainage to incision site or surrounding skin. Patient reports slight swelling to the right side of his incision, this has not worsened. \par \par There is no imaging to review with the patient.\par \par Patient is alert and oriented. No distress noted. Steri-Strips removed without difficulties. No fever or chills noted. Incision without any redness or drainage. Slight swelling noted to right side of the incision. Voice quality normal. Able to move all extremities without difficulties. Strength to bilateral upper extremities equal and normal. Sensation to light touch to bilateral upper extremities equal and normal.\par \par Patient is recovering very well from surgery. We will followup in our office in 4 weeks from today. At that time, we will order an cervical MRI and cervical flexion extension x-rays. I've encouraged patient to continue to evaluate the incision site for any further swelling, redness, or drainage.  I have encouraged the patient to call with any further questions or concerns or with any new or worsening symptoms.\par \par Addendum 1/15/18: patient requesting that we hold off on the MRI at this time and obtain just an xray. Patient states the swelling noted to incision site has significantly improved.  \par  [FreeTextEntry3] : Purvi Gonzalez, MSN, FNP-BC\par Nurse Practitioner\par Department of Neurosurgery\par [address]\[place] [address]\par Tel: [phone]\par FAX: [phone]

## 2019-02-19 NOTE — ED PROVIDER NOTE - SCRIBE NAME
Subjective   Patient ID: Sonia is a 71 year old female.    Chief Complaint   Patient presents with   • Diabetes     HPI here for management of diabetes, patient requesting endocrinology follow-up also for management of hypertension,end-stage renal disease and peripheral vascular disease.  Has occasional leg pains can walk couple blocks without any pain    Sonia has a past medical history of Chronic kidney disease, Clotting disorder (CMS/[facility]), Diabetes mellitus (CMS/[facility]), Essential (primary) hypertension, and GERD (gastroesophageal reflux disease). She also has no past medical history of Allergy, Anemia, Anxiety, Arthritis, Cataract, Congestive cardiac failure (CMS/[facility]), COPD (chronic obstructive pulmonary disease) (CMS/[facility]), Depressive disorder, Meningitis, Osteoporosis, RAD (reactive airway disease), Seizures (CMS/[facility]), Substance abuse (CMS/[facility]), Thyroid disease, or Tuberculosis.  Sonia has Benign hypertension with end-stage renal disease (CMS/[facility]); ESRD (end stage renal disease) on dialysis (CMS/[facility]); Peripheral vascular disease (CMS/[facility]); Proliferative diabetic retinopathy of both eyes with macular edema associated with type 2 diabetes mellitus (CMS/[facility]); Type 2 diabetes mellitus with hypertension and end stage renal disease on dialysis (CMS/[facility]); and Diastolic dysfunction on their problem list.  Sonia has a past surgical history that includes Tubal ligation.  Her family history includes Cancer in her father; Diabetes in her brother, father, and mother; Kidney disease in her brother and mother.  Sonia reports that  has never smoked. she has never used smokeless tobacco. She reports that she does not drink alcohol or use drugs.  Sonia has a current medication list which includes the following prescription(s): atorvastatin, carvedilol, clopidogrel, fluticasone, montelukast, pantoprazole, acetaminophen, and cetirizine hcl.  Sonia   Current Outpatient Medications   Medication Sig Dispense Refill   •  atorvastatin (LIPITOR) 40 MG tablet TAKE 1 TABLET AT BEDTIME.     • carvedilol (COREG) 6.25 MG tablet      • clopidogrel (PLAVIX) 75 MG tablet Take 1 tablet by mouth daily.     • fluticasone (FLONASE) 50 MCG/ACT nasal spray USE 2 SPRAYS IN EACH NOSTRIL ONCE DAILY.     • montelukast (SINGULAIR) 10 MG tablet TAKE 1 TABLET AT BEDTIME.     • pantoprazole (PROTONIX) 40 MG tablet TAKE ONE TABLET BY MOUTH EVERY DAY AS NEEDED     • acetaminophen (TYLENOL) 500 MG tablet 1 tab po q 6 prn headache (PLEASE HAVE PATIENT USE OTC IF INSURANCE DOESN'T COVER)     • Cetirizine HCl (ZYRTEC ALLERGY) 10 MG Cap 1 tab po qhs (please have patient use OTC if not covered by insurance)       No current facility-administered medications for this visit.      Sonia is allergic to sulfa antibiotics.    Review of Systems   Constitutional: Negative.    HENT: Negative.    Eyes: Negative.    Cardiovascular: Negative.    Gastrointestinal: Negative.    Endocrine: Negative.    Genitourinary: Negative.    Allergic/Immunologic: Negative.    Neurological: Negative.    Hematological: Negative.    Psychiatric/Behavioral: Negative.        Objective   Physical Exam  Vitals:    02/19/19 0902   BP: 130/68   Pulse: 67   Resp: 15   Temp: 97.9 °F (36.6 °C)   TempSrc: Oral   SpO2: 99%   Weight: 66.8 kg (147 lb 6 oz)   Height: 5' 2\" (1.575 m)   PainSc:  0     Nursing note and vitals reviewed  Constitutional:  oriented to person, place, and time.  appears well-developed and well-nourished.   HENT:   Head: Normocephalic and atraumatic.   Ears: External ears normal. No cerumen impaction, tympanic membrane normal  Nose: Nose normal.  No hypertrophy or edema of turbinates.  No sinus tenderness  Mouth/Throat: Oropharynx is clear and moist.   Eyes: EOM are normal. Pupils are equal, round, and reactive to light.   Neck: Neck supple. No JVD present. No tracheal deviation present. No thyromegaly present.   Lungs: Clear with good air entry, no wheezes or  rales.  Cardiovascular: Normal rate, regular rhythm and normal heart sounds. Exam reveals no gallop.   No murmur heard.  Abdomen: Not distended, no tenderness, no mass, bowel sounds normal.  Musculoskeletal: Normal range of motion. no extremity edema.   Neurological:  oriented to person, place, and time.   Speech normal , gait normal  No sensory or motor deficit noted   Skin: Skin is dry. No rash   Psychiatric:has a normal mood and affect.     Assessment   Problem List Items Addressed This Visit     Benign hypertension with end-stage renal disease (CMS/[facility]) - Primary  Hypertension controlled     Low-sodium diet advised  Avoid nonsteroidal anti-inflammatory medications  Reduce caffeine intake  Regular exercise advised  Monitor blood pressure at home  Continue current medications          ESRD (end stage renal disease) on dialysis (CMS/[facility])  On hemodialysis  Continue current management    Peripheral vascular disease (CMS/[facility])  Arterial Doppler ordered  Continue Plavix  And statin    Relevant Orders    US VASC EXTREMITY LOWER DUPLEX ARTERIAL    Proliferative diabetic retinopathy of both eyes with macular edema associated with type 2 diabetes mellitus (CMS/[facility])    Type 2 diabetes mellitus with hypertension and end stage renal disease on dialysis (CMS/[facility])  Diabetes controlled without any medication, was on insulin for 30 years  Continue current management  Endocrinology referral given  Ophthalmology follow-up      Relevant Orders    SERVICE TO ENDOCRINOLOGY    Diastolic dysfunction  Stable  Low-sodium diet   Callus on left foot  Followed by podiatrist  Has follow-up appointment      Gokul Savage

## 2019-03-05 ENCOUNTER — FORM ENCOUNTER (OUTPATIENT)
Age: 59
End: 2019-03-05

## 2019-03-06 ENCOUNTER — APPOINTMENT (OUTPATIENT)
Dept: NEUROSURGERY | Facility: CLINIC | Age: 59
End: 2019-03-06
Payer: COMMERCIAL

## 2019-03-06 ENCOUNTER — APPOINTMENT (OUTPATIENT)
Dept: RADIOLOGY | Facility: CLINIC | Age: 59
End: 2019-03-06
Payer: COMMERCIAL

## 2019-03-06 ENCOUNTER — OUTPATIENT (OUTPATIENT)
Dept: OUTPATIENT SERVICES | Facility: HOSPITAL | Age: 59
LOS: 1 days | End: 2019-03-06
Payer: COMMERCIAL

## 2019-03-06 VITALS
WEIGHT: 226 LBS | TEMPERATURE: 97.5 F | SYSTOLIC BLOOD PRESSURE: 155 MMHG | HEART RATE: 68 BPM | RESPIRATION RATE: 16 BRPM | DIASTOLIC BLOOD PRESSURE: 93 MMHG | BODY MASS INDEX: 30.95 KG/M2 | HEIGHT: 71.85 IN

## 2019-03-06 DIAGNOSIS — Z98.890 OTHER SPECIFIED POSTPROCEDURAL STATES: Chronic | ICD-10-CM

## 2019-03-06 DIAGNOSIS — Z00.8 ENCOUNTER FOR OTHER GENERAL EXAMINATION: ICD-10-CM

## 2019-03-06 DIAGNOSIS — Z96.641 PRESENCE OF RIGHT ARTIFICIAL HIP JOINT: Chronic | ICD-10-CM

## 2019-03-06 DIAGNOSIS — Z90.89 ACQUIRED ABSENCE OF OTHER ORGANS: Chronic | ICD-10-CM

## 2019-03-06 PROCEDURE — 99024 POSTOP FOLLOW-UP VISIT: CPT

## 2019-03-06 PROCEDURE — 72040 X-RAY EXAM NECK SPINE 2-3 VW: CPT | Mod: 26

## 2019-03-06 PROCEDURE — 72040 X-RAY EXAM NECK SPINE 2-3 VW: CPT

## 2019-03-06 NOTE — REASON FOR VISIT
[de-identified] : decompression/ removal of anterior cervical osteophytes causing dyshagia C2-3 and C3-4 [de-identified] : 12/21/2018 [de-identified] : 77

## 2019-03-06 NOTE — CONSULT LETTER
[Sincerely,] : Sincerely, [FreeTextEntry1] : I have seen your patient Orion Valladares for further routine postsurgical followup. You may recall that this very pleasant 58-year-old gentleman presented initially with intense neck pain and difficulty with swallowing. Imaging revealed a significant degenerative spondylosis with ankylosing spondylitis pattern. He had large degenerative osteophytes creating mass effect on his esophagus and trachea especially at the C2-3 and C3-4 levels. The patient underwent an anterior cervical approach for removal of those osteophytes on 21 December 2018. The patient indicates significant improvement in his swallowing ability. He also indicates decrease in neck pain especially when trying to flex forward. The patient indicates however, that he still has episodes of electric-type shocks into his arms and periods of numbness especially when awaking from sleep. The patient returns today after undergoing flexion extension x-rays of the cervical spine.\par \par I've reviewed with the patient his recent flexion extension x-rays of the cervical spine which demonstrates good stability without abnormal movement especially at the C2-3 and C3-4 levels. The anterior osteophytes at been adequately removed and there is no longer any significant anterior mass effect on the trachea or esophagus.\par \par On examination of the patient's incision area has healed extremely well. There is no longer any anterior swelling. The patient's voice quality and swallow mechanism are within normal parameters. The patient still has some limitations of neck mobility from known spondylosis of the cervical spine.\par \par The patient has expressed significant concern regarding the known ankylosing spondylitis and previously identified narrowing of the cervical canal especially at the C4-5 level. Because of the patient's ongoing symptoms consistent with Lhermittes and morning numbness involving his hands. In order to better understand if there is significant compression of the spinal cord at C4-5 in particular because of osteophytes I last for the patient to undergo an updated MRI scan of the cervical spine. We have discussed in general terms whether or not an anterior or posterior decompression procedure might be of benefit for these symptoms. I will see the patient again to review the results and discuss further surgical intervention depending on the findings of this imaging study.\par \par Thank you for kindly including me in the ongoing evaluation and treatment of your patient. Please do not hesitate to contact me should any questions or concerns regarding the patient's recent surgery or the proposed additional testing. The patient has indicated willingness to review his ongoing symptoms with rheumatology with the view to initiating possible active treatment for arthritis. [FreeTextEntry3] : Huber Clifford MD, PhD, FRCPSC\par  of Neurosurgery\par [facility]\par  of Neurosurgery\par [facility] \par [address] [place] [address]\[place] [address]\par Tel: [phone]\par FAX: [phone]\par Email: [email or web address] \par [facility]\par Visit us at [facility] <[email or web address]\[facility] \[facility]

## 2019-04-10 ENCOUNTER — APPOINTMENT (OUTPATIENT)
Dept: NEUROSURGERY | Facility: CLINIC | Age: 59
End: 2019-04-10

## 2019-05-03 ENCOUNTER — APPOINTMENT (OUTPATIENT)
Dept: ORTHOPEDIC SURGERY | Facility: CLINIC | Age: 59
End: 2019-05-03

## 2019-09-16 ENCOUNTER — APPOINTMENT (OUTPATIENT)
Dept: INTERNAL MEDICINE | Facility: CLINIC | Age: 59
End: 2019-09-16
Payer: COMMERCIAL

## 2019-09-16 VITALS
TEMPERATURE: 98.5 F | HEART RATE: 61 BPM | OXYGEN SATURATION: 98 % | WEIGHT: 228 LBS | BODY MASS INDEX: 31.22 KG/M2 | HEIGHT: 71.85 IN

## 2019-09-16 VITALS — DIASTOLIC BLOOD PRESSURE: 80 MMHG | SYSTOLIC BLOOD PRESSURE: 130 MMHG

## 2019-09-16 PROCEDURE — 36415 COLL VENOUS BLD VENIPUNCTURE: CPT

## 2019-09-16 PROCEDURE — 99213 OFFICE O/P EST LOW 20 MIN: CPT | Mod: 25

## 2019-09-16 NOTE — REVIEW OF SYSTEMS
[Fever] : no fever [Chills] : no chills [Chest Pain] : no chest pain [Palpitations] : no palpitations [Abdominal Pain] : no abdominal pain [FreeTextEntry6] : told of a 9 mm pulm nodule for which he gets ct chest every 6 months

## 2019-09-16 NOTE — PHYSICAL EXAM
[No Acute Distress] : no acute distress [Normal Outer Ear/Nose] : the outer ears and nose were normal in appearance [Normal Sclera/Conjunctiva] : normal sclera/conjunctiva [No Lymphadenopathy] : no lymphadenopathy [No Respiratory Distress] : no respiratory distress  [Clear to Auscultation] : lungs were clear to auscultation bilaterally [No Accessory Muscle Use] : no accessory muscle use [Normal Rate] : normal rate  [Regular Rhythm] : with a regular rhythm [No Edema] : there was no peripheral edema [Soft] : abdomen soft [No Extremity Clubbing/Cyanosis] : no extremity clubbing/cyanosis [Non Tender] : non-tender [Non-distended] : non-distended

## 2019-09-18 ENCOUNTER — RX RENEWAL (OUTPATIENT)
Age: 59
End: 2019-09-18

## 2019-09-18 LAB
T4 SERPL-MCNC: 6.1 UG/DL
TSH SERPL-ACNC: 5.27 UIU/ML

## 2019-10-17 ENCOUNTER — APPOINTMENT (OUTPATIENT)
Dept: INTERNAL MEDICINE | Facility: CLINIC | Age: 59
End: 2019-10-17
Payer: COMMERCIAL

## 2019-10-17 VITALS
WEIGHT: 228 LBS | DIASTOLIC BLOOD PRESSURE: 88 MMHG | TEMPERATURE: 98.6 F | OXYGEN SATURATION: 97 % | SYSTOLIC BLOOD PRESSURE: 144 MMHG | HEART RATE: 53 BPM | BODY MASS INDEX: 31.05 KG/M2

## 2019-10-17 PROCEDURE — 99215 OFFICE O/P EST HI 40 MIN: CPT

## 2019-10-17 RX ORDER — CELECOXIB 50 MG/1
CAPSULE ORAL
Refills: 0 | Status: DISCONTINUED | COMMUNITY
End: 2019-10-17

## 2019-10-17 RX ORDER — CELECOXIB 200 MG/1
200 CAPSULE ORAL DAILY
Qty: 30 | Refills: 1 | Status: DISCONTINUED | COMMUNITY
Start: 2019-03-06 | End: 2019-10-17

## 2019-10-17 RX ORDER — DEXTROAMPHETAMINE SACCHARATE, AMPHETAMINE ASPARTATE, DEXTROAMPHETAMINE SULFATE, AND AMPHETAMINE SULFATE 3.75; 3.75; 3.75; 3.75 MG/1; MG/1; MG/1; MG/1
15 TABLET ORAL DAILY
Refills: 0 | Status: DISCONTINUED | COMMUNITY
End: 2019-10-17

## 2019-10-17 NOTE — HISTORY OF PRESENT ILLNESS
[No Pertinent Cardiac History] : no history of aortic stenosis, atrial fibrillation, coronary artery disease, recent myocardial infarction, or implantable device/pacemaker [No Pertinent Pulmonary History] : no history of asthma, COPD, sleep apnea, or smoking [No Adverse Anesthesia Reaction] : no adverse anesthesia reaction in self or family member [Excellent (>10 METs)] : Excellent (>10 METs) [(Patient denies any chest pain, claudication, dyspnea on exertion, orthopnea, palpitations or syncope)] : Patient denies any chest pain, claudication, dyspnea on exertion, orthopnea, palpitations or syncope [Chronic Kidney Disease] : no chronic kidney disease [Chronic Anticoagulation] : no chronic anticoagulation [Diabetes] : no diabetes [FreeTextEntry1] : Colonoscopy [FreeTextEntry3] : [facility] [FreeTextEntry2] : 10/21/2019 [FreeTextEntry4] : Pt is a 58 year M with PMH hypothyroidism presents for medical clearance for colonoscopy. Pt has no cardiac or pulmonary history. Called [phone] and spoke to reception who stated they just wanted to know what medications were being taken and any recommendations, etc. No specific concerns for this patient. Pt denies CP/SOB, fever/chills, n/v/d/c.

## 2019-10-17 NOTE — PLAN
[FreeTextEntry1] : Medically optimized for surgery. Pt to call with correct LT4 dosage. Tadalafil renewed per patient request. Avoid use of PRN medications 2 days prior to colonoscopy. Avoid supplements 2 days prior to colonoscopy. f/u 6 weeks.

## 2019-10-17 NOTE — ASSESSMENT
[Patient Optimized for Surgery] : Patient optimized for surgery [No Further Testing Recommended] : no further testing recommended [Modify medications prior to procedure] : Modify medications prior to procedure [As per surgery] : as per surgery [FreeTextEntry7] : Stop all PRN medications 1-2 days prior. Stop Turmeric and supplements 2 days prior.

## 2019-10-18 ENCOUNTER — MEDICATION RENEWAL (OUTPATIENT)
Age: 59
End: 2019-10-18

## 2019-10-18 RX ORDER — LEVOTHYROXINE SODIUM 0.07 MG/1
75 TABLET ORAL DAILY
Qty: 90 | Refills: 1 | Status: DISCONTINUED | COMMUNITY
Start: 1900-01-01 | End: 2019-10-18

## 2019-12-18 ENCOUNTER — RX RENEWAL (OUTPATIENT)
Age: 59
End: 2019-12-18

## 2020-01-03 ENCOUNTER — APPOINTMENT (OUTPATIENT)
Dept: INTERNAL MEDICINE | Facility: CLINIC | Age: 60
End: 2020-01-03
Payer: COMMERCIAL

## 2020-01-03 VITALS
TEMPERATURE: 98.7 F | HEART RATE: 58 BPM | OXYGEN SATURATION: 97 % | WEIGHT: 223 LBS | DIASTOLIC BLOOD PRESSURE: 92 MMHG | BODY MASS INDEX: 31.22 KG/M2 | HEIGHT: 71 IN | SYSTOLIC BLOOD PRESSURE: 148 MMHG

## 2020-01-03 DIAGNOSIS — J06.9 ACUTE UPPER RESPIRATORY INFECTION, UNSPECIFIED: ICD-10-CM

## 2020-01-03 PROCEDURE — 99213 OFFICE O/P EST LOW 20 MIN: CPT

## 2020-01-03 NOTE — HISTORY OF PRESENT ILLNESS
[FreeTextEntry8] : Pt is a 59 year M with PMH orthopedic issues and hypothyroidism presents for URI. Pt complains of 3 days PND, throat soreness, cough/congestion. Pt denies CP/SOB, fever/chills, n/v/d/c.

## 2020-01-03 NOTE — PHYSICAL EXAM
[No Acute Distress] : no acute distress [Well Nourished] : well nourished [Well Developed] : well developed [Well-Appearing] : well-appearing [Normal Sclera/Conjunctiva] : normal sclera/conjunctiva [EOMI] : extraocular movements intact [PERRL] : pupils equal round and reactive to light [Normal Outer Ear/Nose] : the outer ears and nose were normal in appearance [Normal Oropharynx] : the oropharynx was normal [Normal TMs] : both tympanic membranes were normal [No JVD] : no jugular venous distention [Supple] : supple [No Lymphadenopathy] : no lymphadenopathy [Thyroid Normal, No Nodules] : the thyroid was normal and there were no nodules present [No Respiratory Distress] : no respiratory distress  [No Accessory Muscle Use] : no accessory muscle use [Clear to Auscultation] : lungs were clear to auscultation bilaterally [Normal Rate] : normal rate  [Regular Rhythm] : with a regular rhythm [No Murmur] : no murmur heard [Normal S1, S2] : normal S1 and S2 [Normal Posterior Cervical Nodes] : no posterior cervical lymphadenopathy [Coordination Grossly Intact] : coordination grossly intact [Normal Anterior Cervical Nodes] : no anterior cervical lymphadenopathy [No Focal Deficits] : no focal deficits [Normal Gait] : normal gait [Normal Affect] : the affect was normal [Normal Insight/Judgement] : insight and judgment were intact [de-identified] : red swollen mucosa [de-identified] : ACDF scar [de-identified] : Cough

## 2020-01-03 NOTE — REVIEW OF SYSTEMS
[Earache] : no earache [Hearing Loss] : no hearing loss [Nosebleeds] : no nosebleeds [Postnasal Drip] : postnasal drip [Nasal Discharge] : nasal discharge [Hoarseness] : no hoarseness [Sore Throat] : sore throat [Shortness Of Breath] : no shortness of breath [Wheezing] : no wheezing [Cough] : cough [Dyspnea on Exertion] : not dyspnea on exertion [Negative] : Heme/Lymph

## 2020-01-03 NOTE — PLAN
[FreeTextEntry1] : f/u 4 weeks for hypothyroid. Pt will contact if any issues regarding URI, or if worsening by next week.

## 2020-01-15 ENCOUNTER — RX RENEWAL (OUTPATIENT)
Age: 60
End: 2020-01-15

## 2020-01-31 ENCOUNTER — RX RENEWAL (OUTPATIENT)
Age: 60
End: 2020-01-31

## 2020-04-29 ENCOUNTER — RX RENEWAL (OUTPATIENT)
Age: 60
End: 2020-04-29

## 2020-05-26 NOTE — CONSULT NOTE ADULT - ATTENDING COMMENTS
Abdominal Pain, N/V/D Patient seen and examined, following are changes/additions to student note.  Patient c/o several days of acute onset right side neck pain radiating into top of shoulder to lateral border of deltoid.  Pain at times in lateral hand as well, he states the arm has been weak, but his symptoms improved with steroids and valium.  He denies LE symptoms, urinary symptoms.    On exam, he has 1+ right biceps DTR and 2+ left biceps DTR, triceps 1+ bilat.  Power is full both arms, legs normal.  Sensory exam is normal.     IMP:  Patient has clinical right C5 radiculopathy which is purely sensory.  Would treat with 7 more days of prednisone 50mg each morning with breakfast and 7 more days valium 5mg QHS (as a muscle relaxant).  Would get MRI C spine as outpatient and f/u with his PMD for physical therapy.

## 2020-06-09 ENCOUNTER — APPOINTMENT (OUTPATIENT)
Dept: NEUROSURGERY | Facility: CLINIC | Age: 60
End: 2020-06-09
Payer: COMMERCIAL

## 2020-06-09 PROCEDURE — 99213 OFFICE O/P EST LOW 20 MIN: CPT

## 2020-06-10 VITALS
SYSTOLIC BLOOD PRESSURE: 145 MMHG | WEIGHT: 222 LBS | HEIGHT: 71 IN | HEART RATE: 61 BPM | BODY MASS INDEX: 31.08 KG/M2 | DIASTOLIC BLOOD PRESSURE: 83 MMHG | OXYGEN SATURATION: 97 % | TEMPERATURE: 98.3 F

## 2020-06-10 NOTE — CONSULT LETTER
[Dear  ___] : Dear  [unfilled], [Sincerely,] : Sincerely, [Courtesy Letter:] : I had the pleasure of seeing your patient, [unfilled], in my office today. [FreeTextEntry2] : Sushil Monet MD\par [address] [place]\par [place] [id number] [FreeTextEntry1] : Orion Valladares is a 59-year-old who presents today with cervical symptoms.  Patient is well-known to our office.  He had previously presented to our office with neck pain and difficulties with swallowing.  Imaging revealed a significant degenerative spondylosis with ankylosing spondylitis pattern.  He was found to have large degenerative osteophytes creating mass-effect on his esophagus and trachea especially at C2-3 and C3-4.  Patient therefore underwent anterior cervical approach for removal of these osteophytes on December 21, 2018.  Patient states he has noted clicking of his neck particularly with turning to the left.  He has reports having cervical stiffness.  He reports decreased range of motion.  He reports a "pinch" to his right bicep.  He denies any numbness, tingling, weakness to his bilateral upper extremities.  He denies any difficulties with swallowing or changes in voice quality.\par \par There is no recent imaging to review with the patient.\par \par Patient is alert and oriented.  No distress noted.  Negative Flakita's.  Sensation to light touch to bilateral upper extremities equal and normal.  5/5 strength to bilateral upper arms.  Reflexes to bilateral arms symmetric and normal.  Patient is walking without difficulty. \par \par I provided the patient with an x-ray of the cervical spine.  I have asked that the patient call once his x-ray is complete at which time we will discuss the next steps of the treatment plan over the phone.  Patient is also aware to call with any further questions or concerns\par  [FreeTextEntry3] : Purvi Gonzalez, MSN, FNP-BC\par Nurse Practitioner\par Neurosurgery\par [address] [place] [address], [address]nd floor \[place] [address] \par Office: [phone] \par Fax: [phone]\par \par

## 2020-07-06 NOTE — H&P PST ADULT - FALL HARM RISK TYPE OF ASSESSMENT
Marie Koch, WEST 2 minutes ago (1:22 PM)        My mistake, she is not on warfarin, she is on Plavix. Per Ami at Dr Huynh' office it is OK for her to hold Plavix for colonoscopy.            Admission

## 2020-08-10 ENCOUNTER — RX RENEWAL (OUTPATIENT)
Age: 60
End: 2020-08-10

## 2020-08-11 ENCOUNTER — RX RENEWAL (OUTPATIENT)
Age: 60
End: 2020-08-11

## 2020-09-01 ENCOUNTER — APPOINTMENT (OUTPATIENT)
Dept: INTERNAL MEDICINE | Facility: CLINIC | Age: 60
End: 2020-09-01
Payer: COMMERCIAL

## 2020-09-01 PROCEDURE — 99213 OFFICE O/P EST LOW 20 MIN: CPT | Mod: 95

## 2020-09-01 NOTE — HISTORY OF PRESENT ILLNESS
[de-identified] : Pt asks for telehealth visit during pandemic  for med renewal  Aware now that he needs TSH blood test as it has been a year.  Currently quarantining [Home] : at home, [unfilled] , at the time of the visit. [Medical Office: ([facility])___] : at the medical office located in

## 2020-11-30 ENCOUNTER — RX RENEWAL (OUTPATIENT)
Age: 60
End: 2020-11-30

## 2020-12-22 ENCOUNTER — APPOINTMENT (OUTPATIENT)
Dept: INTERNAL MEDICINE | Facility: CLINIC | Age: 60
End: 2020-12-22
Payer: COMMERCIAL

## 2020-12-22 VITALS
HEIGHT: 71 IN | RESPIRATION RATE: 16 BRPM | HEART RATE: 64 BPM | TEMPERATURE: 98.2 F | BODY MASS INDEX: 31.98 KG/M2 | OXYGEN SATURATION: 97 % | WEIGHT: 228.4 LBS

## 2020-12-22 VITALS — SYSTOLIC BLOOD PRESSURE: 138 MMHG | DIASTOLIC BLOOD PRESSURE: 80 MMHG

## 2020-12-22 PROCEDURE — 99213 OFFICE O/P EST LOW 20 MIN: CPT | Mod: 25

## 2020-12-22 PROCEDURE — 99072 ADDL SUPL MATRL&STAF TM PHE: CPT

## 2020-12-22 PROCEDURE — 36415 COLL VENOUS BLD VENIPUNCTURE: CPT

## 2020-12-22 NOTE — PHYSICAL EXAM
[No Acute Distress] : no acute distress [Normal Outer Ear/Nose] : the outer ears and nose were normal in appearance [No JVD] : no jugular venous distention [No Respiratory Distress] : no respiratory distress  [No Accessory Muscle Use] : no accessory muscle use [Clear to Auscultation] : lungs were clear to auscultation bilaterally [Normal Rate] : normal rate  [Regular Rhythm] : with a regular rhythm [Normal S1, S2] : normal S1 and S2 [No Edema] : there was no peripheral edema [No Extremity Clubbing/Cyanosis] : no extremity clubbing/cyanosis [Soft] : abdomen soft [Non Tender] : non-tender

## 2020-12-22 NOTE — HISTORY OF PRESENT ILLNESS
[de-identified] : Pt comes for thyroid check and med renewal  Interested in testosterone level also as he has no sx drive

## 2020-12-22 NOTE — REVIEW OF SYSTEMS
[Poor Libido] : poor libido [Fever] : no fever [Chills] : no chills [Chest Pain] : no chest pain [Palpitations] : no palpitations [Lower Ext Edema] : no lower extremity edema [Shortness Of Breath] : no shortness of breath [Wheezing] : no wheezing [Cough] : no cough [Abdominal Pain] : no abdominal pain

## 2020-12-23 PROBLEM — J06.9 URI, ACUTE: Status: RESOLVED | Noted: 2020-01-03 | Resolved: 2020-12-23

## 2020-12-23 LAB
ALBUMIN SERPL ELPH-MCNC: 4.9 G/DL
ALP BLD-CCNC: 77 U/L
ALT SERPL-CCNC: 52 U/L
ANION GAP SERPL CALC-SCNC: 11 MMOL/L
AST SERPL-CCNC: 34 U/L
BASOPHILS # BLD AUTO: 0.05 K/UL
BASOPHILS NFR BLD AUTO: 0.7 %
BILIRUB SERPL-MCNC: 0.6 MG/DL
BUN SERPL-MCNC: 15 MG/DL
CALCIUM SERPL-MCNC: 10.2 MG/DL
CHLORIDE SERPL-SCNC: 106 MMOL/L
CO2 SERPL-SCNC: 25 MMOL/L
CREAT SERPL-MCNC: 1.07 MG/DL
EOSINOPHIL # BLD AUTO: 0.13 K/UL
EOSINOPHIL NFR BLD AUTO: 1.9 %
GLUCOSE SERPL-MCNC: 81 MG/DL
HCT VFR BLD CALC: 49.7 %
HGB BLD-MCNC: 16 G/DL
IMM GRANULOCYTES NFR BLD AUTO: 0.6 %
LYMPHOCYTES # BLD AUTO: 1.68 K/UL
LYMPHOCYTES NFR BLD AUTO: 24.5 %
MAN DIFF?: NORMAL
MCHC RBC-ENTMCNC: 29.7 PG
MCHC RBC-ENTMCNC: 32.2 GM/DL
MCV RBC AUTO: 92.2 FL
MONOCYTES # BLD AUTO: 0.57 K/UL
MONOCYTES NFR BLD AUTO: 8.3 %
NEUTROPHILS # BLD AUTO: 4.39 K/UL
NEUTROPHILS NFR BLD AUTO: 64 %
PLATELET # BLD AUTO: 215 K/UL
POTASSIUM SERPL-SCNC: 4.7 MMOL/L
PROT SERPL-MCNC: 7.4 G/DL
RBC # BLD: 5.39 M/UL
RBC # FLD: 12.9 %
SODIUM SERPL-SCNC: 142 MMOL/L
T4 FREE SERPL-MCNC: 1 NG/DL
TSH SERPL-ACNC: 9.32 UIU/ML
WBC # FLD AUTO: 6.86 K/UL

## 2021-01-04 LAB
TESTOST BND SERPL-MCNC: 5.5 PG/ML
TESTOST SERPL-MCNC: 241.6 NG/DL

## 2021-03-13 ENCOUNTER — EMERGENCY (EMERGENCY)
Facility: HOSPITAL | Age: 61
LOS: 1 days | Discharge: HOME | End: 2021-03-13
Attending: EMERGENCY MEDICINE | Admitting: EMERGENCY MEDICINE
Payer: COMMERCIAL

## 2021-03-13 VITALS
HEART RATE: 67 BPM | TEMPERATURE: 98 F | OXYGEN SATURATION: 98 % | RESPIRATION RATE: 15 BRPM | SYSTOLIC BLOOD PRESSURE: 138 MMHG | DIASTOLIC BLOOD PRESSURE: 81 MMHG

## 2021-03-13 VITALS
TEMPERATURE: 98 F | WEIGHT: 227.08 LBS | HEIGHT: 71 IN | DIASTOLIC BLOOD PRESSURE: 84 MMHG | RESPIRATION RATE: 16 BRPM | OXYGEN SATURATION: 98 % | SYSTOLIC BLOOD PRESSURE: 143 MMHG | HEART RATE: 66 BPM

## 2021-03-13 DIAGNOSIS — Z98.890 OTHER SPECIFIED POSTPROCEDURAL STATES: Chronic | ICD-10-CM

## 2021-03-13 DIAGNOSIS — Z90.89 ACQUIRED ABSENCE OF OTHER ORGANS: Chronic | ICD-10-CM

## 2021-03-13 DIAGNOSIS — Z96.641 PRESENCE OF RIGHT ARTIFICIAL HIP JOINT: Chronic | ICD-10-CM

## 2021-03-13 PROCEDURE — 73610 X-RAY EXAM OF ANKLE: CPT | Mod: 26,RT

## 2021-03-13 PROCEDURE — 73562 X-RAY EXAM OF KNEE 3: CPT

## 2021-03-13 PROCEDURE — 73562 X-RAY EXAM OF KNEE 3: CPT | Mod: 26,RT

## 2021-03-13 PROCEDURE — 93971 EXTREMITY STUDY: CPT

## 2021-03-13 PROCEDURE — 73590 X-RAY EXAM OF LOWER LEG: CPT | Mod: 26,RT

## 2021-03-13 PROCEDURE — 73590 X-RAY EXAM OF LOWER LEG: CPT

## 2021-03-13 PROCEDURE — 93971 EXTREMITY STUDY: CPT | Mod: 26,RT

## 2021-03-13 PROCEDURE — 73610 X-RAY EXAM OF ANKLE: CPT

## 2021-03-13 PROCEDURE — 99284 EMERGENCY DEPT VISIT MOD MDM: CPT | Mod: 25

## 2021-03-13 PROCEDURE — 99285 EMERGENCY DEPT VISIT HI MDM: CPT

## 2021-03-13 NOTE — ED PROVIDER NOTE - NSFOLLOWUPINSTRUCTIONS_ED_ALL_ED_FT
rest, ice, compression, elevation, support  motrin or aleve over the counter for pain and inflammation  ankle x-ray was suspicious for possible small avulsion fracture. continue ace wrap, cane, and follow up with orthopedics   have close follow up with orthopedics         Carr Cyst    WHAT YOU NEED TO KNOW:    A Baker cyst is a bulging lump of fluid behind your knee. A Baker cyst can develop if you have a knee injury, or a condition such as osteoarthritis or a connective tissue disorder. A Baker cyst may also be called a popliteal cyst.    DISCHARGE INSTRUCTIONS:    Return to the emergency department if:   •You have severe pain.      •You have bruising on the ankle below the cyst.      •Your calf turns blue below the cyst.      •Your calf or knee is swollen or bleeding.      Call your doctor if:   •You have a fever.      •Your pain does not improve with medicine.      •You have questions or concerns about your condition or care.      Medicines:   •NSAIDs, such as ibuprofen, help decrease swelling, pain, and fever. NSAIDs can cause stomach bleeding or kidney problems in certain people. If you take blood thinner medicine, always ask your healthcare provider if NSAIDs are safe for you. Always read the medicine label and follow directions.      •Take your medicine as directed. Contact your healthcare provider if you think your medicine is not helping or if you have side effects. Tell him of her if you are allergic to any medicine. Keep a list of the medicines, vitamins, and herbs you take. Include the amounts, and when and why you take them. Bring the list or the pill bottles to follow-up visits. Carry your medicine list with you in case of an emergency.      Care for your knee:   •Rest as needed. Limit movement as your knee heals. This will help decrease the risk of more damage to your knee. You may need crutches to take weight off your injured knee. Use crutches as directed.      •Ice your knee. Ice helps decrease swelling and pain. Use an ice pack, or put ice in a plastic bag. Cover it with a towel before you place it on your skin. Ice your knee for 15 to 20 minutes, 3 to 4 times each day. Do this for 2 to 3 days.      •Support your knee. Wrap your knee with an elastic bandage. Ask your healthcare provider if you need a brace for more support. This will help decrease swelling and movement so your knee can heal.      •Elevate your knee. Use pillows to raise your knee above the level of your heart as often as you can. This will help decrease swelling. Do not put the pillow directly under your knee. Put it under your calf instead.  Elevate Leg           •Go to physical therapy as directed. A physical therapist teaches you exercises to help improve movement and strength, and to decrease pain.      Follow up with your doctor as directed: Write down your questions so you remember to ask them during your visits.

## 2021-03-13 NOTE — ED PROVIDER NOTE - WR ORDER NAME 2
Addended by: CALEB FOLEY on: 9/19/2019 01:39 PM     Modules accepted: Orders    
Xray Tibia + Fibula 2 Views, Right

## 2021-03-13 NOTE — ED PROVIDER NOTE - CARE PROVIDER_API CALL
Christian Ortiz)  Orthopaedic Surgery  [address], [address]  [place] [address]  Phone: [phone]  Fax: [phone]  Follow Up Time: 1-3 Days

## 2021-03-13 NOTE — ED ADULT TRIAGE NOTE - CHIEF COMPLAINT QUOTE
" I have pain , swelling and black and blue decolorization of my  right side knee going down to my foot. This is ongoing for 3 days now. No hx of fall No trauma PMH Right Hip replacement 2017 " I have pain , swelling and black and blue decolorization of my  right side knee going down to my foot. This is ongoing for 3 days now. No hx of fall No trauma PMH Right Hip replacement 2017, sciatica

## 2021-03-13 NOTE — ED PROVIDER NOTE - ATTENDING CONTRIBUTION TO CARE
Robert AS for Dr. Love: 59 y/o male with hx of hypothyroidism, ADHD, chronic neck and back pain, OA, right hip replacement 2 years ago presents with pain and ecchymosis to right lower leg for the past 2 days, denies any injury or trauma but thinks he may have twisted his knee two days ago, pt was concerned because he had tenderness to right lower leg and noticed ecchymosis to right ankle. Denies any ankle pain, ambulatory w/o limp. Has not taken anything OTC for pain or symptoms. Pt states "I just wanted to be on the safe side to make sure its not a fracture or infection" since hx of hip replacement to same leg 2 years ago. Denies fevers, chills, body aches. No hx of DVT/PE. No cp, sob. PCP: Sushil Yadav. PE: +laying in bed. mild ttp to the superior medial aspect of the right knee, no posterior ttp, full rom of the knee, ttp to the mid medial calf, no obvious deformity or cellulitis appreciated, no significant swelling noted, ecchymosis to the right medial aspect of the ankle with some point tenderness just distal to the medial malleolus, achilles reflex intact, no ttp to MCPs, sensation intact, cap refill less than 2 seconds, +pedal pusle no ttp to tibial plateau. No increased warmth noted to ext. MDM: 61 yo male presenting with RLE pain. Unclear origin, concern for fracture vs DVT vs Ligament/tendon/muscles injury, will obtain XR, venous doppler, advised that we will not be able to evaluate for soft tissue injury, if symptoms continue will need to follow up op with MRI

## 2021-03-13 NOTE — ED PROVIDER NOTE - CLINICAL SUMMARY MEDICAL DECISION MAKING FREE TEXT BOX
presents with pain to right knee and right lower leg swelling to right ankle, pt has concern for infection or small fracture, no warmth or erythema to suggest infection, suspect ecchymosis to ankle due to strain or sprain will xr to eval for fracture, although low suspicion, doppler to eval for dvt, ortho followup

## 2021-03-13 NOTE — ED PROVIDER NOTE - MUSCULOSKELETAL, MLM
+mild soft tissue tenderness to medial aspect of right knee and right mid leg, full rom of the hip, knee and ankle, no palpable chords, ecchymosis to medial aspect of right ankle, no ankle tenderness, no laxity appreciated to ankle or knee joints +mild soft tissue tenderness to medial aspect of right knee and right mid leg, full rom of the hip, knee and ankle, no palpable chords, ecchymosis to medial aspect of right ankle with slight soft tissue tenderness.  no laxity appreciated to ankle or knee joints

## 2021-03-13 NOTE — ED PROVIDER NOTE - PROGRESS NOTE DETAILS
Robert AS for Dr. Love: 61 y/o male with hx of hypothyroidism, ADHD, chronic neck and back pain, OA, right hip replacement 2 years ago presents with pain and ecchymosis to right lower leg for the past 2 days, denies any injury or trauma but thinks he may have twisted his knee two days ago, pt was concerned because he had tenderness to right lower leg and noticed ecchymosis to right ankle. Denies any ankle pain, ambulatory w/o limp. Has not taken anything OTC for pain or symptoms. Pt states "I just wanted to be on the safe side to make sure its not a fracture or infection" since hx of hip replacement to same leg 2 years ago. Denies fevers, chills, body aches. No hx of DVT/PE. No cp, sob. PCP: Sushil Yadav. PE: +laying in bed. mild ttp to the superior medial aspect of the right knee, no posterior ttp, full rom of the knee, ttp to the mid medial calf, no obvious deformity or cellulitis appreciated, no significant swelling noted, ecchymosis to the right medial aspect of the ankle with some point tenderness just distal to the medial malleolus, achilles reflex intact, no ttp to MCPs, sensation intact, cap refill less than 2 seconds, no ttp to tibial plateau. MDM: 59 yo male presenting with RLE pain. Unclear origin, concern for fracture vs DVT vs Ligament/tendon/muscles injury, will obtain XR, venous doppler, advised that we will not be able to evaluate for soft tissue injury, if symptoms continue will need to follow up op with MRI Reevaluated patient at bedside.  Patient feeling improved.  Discussed the results of all diagnostic testing in ED and copies of all reports given.  discussed symptoms may be due to leakage from baker's cyst. also discussed finding of possible avulsion fracture off medical malleolus. ace wrap applied to right ankle. post application shows good alignment, N/V intact. offered crutches, patient declines. has cane at home. recommend close ortho follow up. has seen [facility] ortho group in past per patient. RICES discussed.  An opportunity to ask questions was given.  Discussed the importance of prompt, close medical follow-up.  Patient will return with any changes, concerns or persistent / worsening symptoms.  Understanding of all instructions verbalized.

## 2021-03-13 NOTE — ED PROVIDER NOTE - NEUROLOGICAL, MLM
Alert and oriented, no focal deficits, no motor or sensory deficits. Alert and oriented, no focal deficits, no motor or sensory deficits. +distal pulses

## 2021-03-13 NOTE — ED PROVIDER NOTE - SKIN, MLM
Skin normal color for race, warm, dry and intact. No evidence of rash. no warmth or erythema, no signs of infection

## 2021-03-13 NOTE — ED PROVIDER NOTE - PATIENT PORTAL LINK FT
You can access the FollowMyHealth Patient Portal offered by [facility] by registering at the following website: [email or web address]. By joining [facility]’s FollowMyHealth portal, you will also be able to view your health information using other applications (apps) compatible with our system.

## 2021-03-13 NOTE — ED PROVIDER NOTE - CARE PLAN
Principal Discharge DX:	Baker's cyst of knee, right  Secondary Diagnosis:	Ankle swelling, right  Secondary Diagnosis:	Acute leg pain, right

## 2021-03-13 NOTE — ED ADULT NURSE NOTE - OBJECTIVE STATEMENT
" I have pain , swelling and black and blue decolorization of my  right side knee going down to my foot. This is ongoing for 3 days now. No hx of fall No trauma PMH Right Hip replacement 2017, sciatica

## 2021-03-13 NOTE — ED PROVIDER NOTE - OBJECTIVE STATEMENT
61 y/o male with hx of hypothyroidism, ADHD, chronic neck and back pain, OA, right hip replacement 2 years ago presents with pain and ecchymosis to right lower leg for the past 2 days, denies any injury or trauma but thinks he may have twisted his knee two days ago, pt was concerned because he had tenderness to right lower leg and noticed ecchymosis to right ankle. Denies any ankle pain, ambulatory w/o limp. Has not taken anything OTC for pain or symptoms. Pt states "I just wanted to be on the safe side to make sure its not a fracture or infection" since hx of hip replacement to same leg 2 years ago. Denies fevers, chills, body aches. No DVT/PE. No cp, sob. PCP: Sushil Yadav

## 2021-03-16 ENCOUNTER — NON-APPOINTMENT (OUTPATIENT)
Age: 61
End: 2021-03-16

## 2021-06-01 ENCOUNTER — APPOINTMENT (OUTPATIENT)
Dept: ORTHOPEDIC SURGERY | Facility: CLINIC | Age: 61
End: 2021-06-01

## 2021-06-18 ENCOUNTER — RX RENEWAL (OUTPATIENT)
Age: 61
End: 2021-06-18

## 2021-07-12 ENCOUNTER — APPOINTMENT (OUTPATIENT)
Dept: INTERNAL MEDICINE | Facility: CLINIC | Age: 61
End: 2021-07-12
Payer: COMMERCIAL

## 2021-07-12 VITALS
TEMPERATURE: 98 F | WEIGHT: 228 LBS | HEIGHT: 71 IN | HEART RATE: 69 BPM | BODY MASS INDEX: 31.92 KG/M2 | OXYGEN SATURATION: 99 % | RESPIRATION RATE: 16 BRPM

## 2021-07-12 VITALS — SYSTOLIC BLOOD PRESSURE: 140 MMHG | DIASTOLIC BLOOD PRESSURE: 80 MMHG

## 2021-07-12 PROCEDURE — 99213 OFFICE O/P EST LOW 20 MIN: CPT | Mod: 25

## 2021-07-12 NOTE — PHYSICAL EXAM
[No Acute Distress] : no acute distress [Normal Sclera/Conjunctiva] : normal sclera/conjunctiva [Normal Outer Ear/Nose] : the outer ears and nose were normal in appearance [No JVD] : no jugular venous distention [No Lymphadenopathy] : no lymphadenopathy [Supple] : supple [No Respiratory Distress] : no respiratory distress  [No Accessory Muscle Use] : no accessory muscle use [Normal Rate] : normal rate  [Regular Rhythm] : with a regular rhythm [No Edema] : there was no peripheral edema [No Extremity Clubbing/Cyanosis] : no extremity clubbing/cyanosis [Soft] : abdomen soft [Non Tender] : non-tender [Non-distended] : non-distended

## 2021-07-13 LAB
ALBUMIN SERPL ELPH-MCNC: 4.8 G/DL
ALP BLD-CCNC: 82 U/L
ALT SERPL-CCNC: 42 U/L
ANION GAP SERPL CALC-SCNC: 12 MMOL/L
AST SERPL-CCNC: 31 U/L
BILIRUB SERPL-MCNC: 0.5 MG/DL
BUN SERPL-MCNC: 13 MG/DL
CALCIUM SERPL-MCNC: 10.3 MG/DL
CHLORIDE SERPL-SCNC: 100 MMOL/L
CO2 SERPL-SCNC: 25 MMOL/L
CREAT SERPL-MCNC: 1.08 MG/DL
GLUCOSE SERPL-MCNC: 96 MG/DL
POTASSIUM SERPL-SCNC: 4.2 MMOL/L
PROT SERPL-MCNC: 7.2 G/DL
SODIUM SERPL-SCNC: 138 MMOL/L

## 2021-07-15 ENCOUNTER — NON-APPOINTMENT (OUTPATIENT)
Age: 61
End: 2021-07-15

## 2021-07-15 LAB
T4 FREE SERPL-MCNC: 1.2 NG/DL
TESTOST BND SERPL-MCNC: 5.4 PG/ML
TESTOSTERONE TOTAL S: 147 NG/DL
TSH SERPL-ACNC: 5.35 UIU/ML

## 2021-08-18 DIAGNOSIS — R35.1 NOCTURIA: ICD-10-CM

## 2021-08-19 ENCOUNTER — APPOINTMENT (OUTPATIENT)
Dept: UROLOGY | Facility: CLINIC | Age: 61
End: 2021-08-19
Payer: COMMERCIAL

## 2021-08-19 VITALS
HEIGHT: 71 IN | SYSTOLIC BLOOD PRESSURE: 131 MMHG | WEIGHT: 228 LBS | HEART RATE: 58 BPM | DIASTOLIC BLOOD PRESSURE: 76 MMHG | BODY MASS INDEX: 31.92 KG/M2 | TEMPERATURE: 97.3 F | OXYGEN SATURATION: 98 %

## 2021-08-19 DIAGNOSIS — Z85.038 PERSONAL HISTORY OF OTHER MALIGNANT NEOPLASM OF LARGE INTESTINE: ICD-10-CM

## 2021-08-19 PROCEDURE — 51798 US URINE CAPACITY MEASURE: CPT

## 2021-08-19 PROCEDURE — 99204 OFFICE O/P NEW MOD 45 MIN: CPT | Mod: 25

## 2021-08-19 RX ORDER — CELECOXIB 200 MG/1
200 CAPSULE ORAL TWICE DAILY
Qty: 90 | Refills: 0 | Status: DISCONTINUED | COMMUNITY
Start: 2019-09-18 | End: 2021-08-19

## 2021-08-19 RX ORDER — UBIDECARENONE 100 MG
100 CAPSULE ORAL
Refills: 0 | Status: ACTIVE | COMMUNITY
Start: 2021-08-19

## 2021-08-19 RX ORDER — TADALAFIL 20 MG/1
20 TABLET ORAL DAILY
Qty: 20 | Refills: 9 | Status: DISCONTINUED | COMMUNITY
Start: 2019-09-20 | End: 2021-08-19

## 2021-08-19 RX ORDER — PNV NO.95/FERROUS FUM/FOLIC AC 28MG-0.8MG
TABLET ORAL
Refills: 0 | Status: ACTIVE | COMMUNITY
Start: 2018-11-05

## 2021-08-19 RX ORDER — MOMETASONE 50 UG/1
50 SPRAY, METERED NASAL TWICE DAILY
Qty: 1 | Refills: 2 | Status: DISCONTINUED | COMMUNITY
Start: 2020-01-03 | End: 2021-08-19

## 2021-08-19 RX ORDER — DOXYCYCLINE HYCLATE 100 MG/1
100 TABLET ORAL
Qty: 28 | Refills: 0 | Status: DISCONTINUED | COMMUNITY
Start: 2020-03-09 | End: 2021-08-19

## 2021-08-19 RX ORDER — SILDENAFIL 100 MG/1
100 TABLET, FILM COATED ORAL
Qty: 10 | Refills: 12 | Status: DISCONTINUED | COMMUNITY
Start: 2020-03-09 | End: 2021-08-19

## 2021-08-19 RX ORDER — BENZONATATE 200 MG/1
200 CAPSULE ORAL 3 TIMES DAILY
Qty: 21 | Refills: 0 | Status: DISCONTINUED | COMMUNITY
Start: 2020-01-03 | End: 2021-08-19

## 2021-08-19 NOTE — REVIEW OF SYSTEMS
[Poor quality erections] : Poor quality erections [No erections] : no erections [Joint Pain] : joint pain [Negative] : Heme/Lymph [see HPI] : see HPI

## 2021-08-19 NOTE — PHYSICAL EXAM
[General Appearance - Well Developed] : well developed [General Appearance - Well Nourished] : well nourished [Normal Appearance] : normal appearance [Well Groomed] : well groomed [General Appearance - In No Acute Distress] : no acute distress [Edema] : no peripheral edema [Respiration, Rhythm And Depth] : normal respiratory rhythm and effort [Exaggerated Use Of Accessory Muscles For Inspiration] : no accessory muscle use [Abdomen Soft] : soft [Abdomen Tenderness] : non-tender [Costovertebral Angle Tenderness] : no ~M costovertebral angle tenderness [Urethral Meatus] : meatus normal [Urinary Bladder Findings] : the bladder was normal on palpation [Scrotum] : the scrotum was normal [Testes Mass (___cm)] : there were no testicular masses [No Prostate Nodules] : no prostate nodules [Normal Station and Gait] : the gait and station were normal for the patient's age [] : no rash [No Focal Deficits] : no focal deficits [Oriented To Time, Place, And Person] : oriented to person, place, and time [Affect] : the affect was normal [Mood] : the mood was normal [Not Anxious] : not anxious [No Palpable Adenopathy] : no palpable adenopathy [Prostate Size ___ gm] : prostate size [unfilled] gm

## 2021-08-20 ENCOUNTER — NON-APPOINTMENT (OUTPATIENT)
Age: 61
End: 2021-08-20

## 2021-08-20 LAB
APPEARANCE: CLEAR
BACTERIA: NEGATIVE
BASOPHILS # BLD AUTO: 0.04 K/UL
BASOPHILS NFR BLD AUTO: 0.8 %
BILIRUBIN URINE: NEGATIVE
BLOOD URINE: NEGATIVE
CHOLEST SERPL-MCNC: 211 MG/DL
COLOR: YELLOW
EOSINOPHIL # BLD AUTO: 0.15 K/UL
EOSINOPHIL NFR BLD AUTO: 3 %
ESTRADIOL SERPL-MCNC: 22 PG/ML
FSH SERPL-MCNC: 3.5 IU/L
GLUCOSE QUALITATIVE U: NEGATIVE
HCT VFR BLD CALC: 47.2 %
HDLC SERPL-MCNC: 70 MG/DL
HGB BLD-MCNC: 15.5 G/DL
HYALINE CASTS: 0 /LPF
IMM GRANULOCYTES NFR BLD AUTO: 0.6 %
KETONES URINE: NORMAL
LDLC SERPL CALC-MCNC: 109 MG/DL
LEUKOCYTE ESTERASE URINE: NEGATIVE
LH SERPL-ACNC: 2.1 IU/L
LYMPHOCYTES # BLD AUTO: 1.33 K/UL
LYMPHOCYTES NFR BLD AUTO: 26.5 %
MAN DIFF?: NORMAL
MCHC RBC-ENTMCNC: 29.2 PG
MCHC RBC-ENTMCNC: 32.8 GM/DL
MCV RBC AUTO: 89.1 FL
MICROSCOPIC-UA: NORMAL
MONOCYTES # BLD AUTO: 0.49 K/UL
MONOCYTES NFR BLD AUTO: 9.8 %
NEUTROPHILS # BLD AUTO: 2.98 K/UL
NEUTROPHILS NFR BLD AUTO: 59.3 %
NITRITE URINE: NEGATIVE
NONHDLC SERPL-MCNC: 140 MG/DL
PH URINE: 6
PLATELET # BLD AUTO: 207 K/UL
PROLACTIN SERPL-MCNC: 9.5 NG/ML
PROTEIN URINE: NORMAL
PSA SERPL-MCNC: 0.86 NG/ML
RBC # BLD: 5.3 M/UL
RBC # FLD: 13.7 %
RED BLOOD CELLS URINE: 2 /HPF
SPECIFIC GRAVITY URINE: 1.03
SQUAMOUS EPITHELIAL CELLS: 0 /HPF
TESTOST SERPL-MCNC: 147 NG/DL
TRIGL SERPL-MCNC: 159 MG/DL
TSH SERPL-ACNC: 0.74 UIU/ML
UROBILINOGEN URINE: NORMAL
WBC # FLD AUTO: 5.02 K/UL
WHITE BLOOD CELLS URINE: 0 /HPF

## 2021-08-22 LAB — BACTERIA UR CULT: NORMAL

## 2021-08-24 NOTE — HISTORY OF PRESENT ILLNESS
[FreeTextEntry1] : 61 y/o male c/o low energy, sex drive and weak erection x 4 years. Patient had a testosterone blood test showed low level. Patient never tried testosterone replacement therapy, but it has been low twice and he feels lousy and says erections have gotten very poor. \par c/o weak erection sometimes not enough to penetrate. He looses erections fast without ejaculation. Patient tried tadalafil with no help and Tadalafil also caused back pain. \par \par Took a comprehensive H & P and detailed ROS as both he and wife were contributing great detail.\par IPSS 5\par \par No FH of CaP, no issues with infertility and he denies prior GH, stones, UTI or retention.

## 2021-08-24 NOTE — ASSESSMENT
[FreeTextEntry1] : 59 y/o male with low testosterone and erectile dysfunction\par -will start Sildenafil 20 mg tablets 2-5 tab po to take 2-3 hrs after a meal and then approx 1 hr prior to sexual activity; will require physical stimulation\par -will check testosterone, Estradiol, FSH, LH, PSA, prolactin, TSH, urinalysis and urine culture\par -f/u for testosterone injection if still low\par \par I will call him with results\par \par WE discussed low T, studies, data of benefits and also spoke of ED for 53 minutes.\par \par Needs rheumatology per some notes in chart and endocrine as he is having a problem regulating his thyroid per pt and his wife.

## 2021-09-17 ENCOUNTER — APPOINTMENT (OUTPATIENT)
Dept: UROLOGY | Facility: CLINIC | Age: 61
End: 2021-09-17

## 2021-09-30 ENCOUNTER — NON-APPOINTMENT (OUTPATIENT)
Age: 61
End: 2021-09-30

## 2021-09-30 ENCOUNTER — APPOINTMENT (OUTPATIENT)
Dept: ORTHOPEDIC SURGERY | Facility: CLINIC | Age: 61
End: 2021-09-30
Payer: COMMERCIAL

## 2021-09-30 VITALS
SYSTOLIC BLOOD PRESSURE: 120 MMHG | HEART RATE: 58 BPM | DIASTOLIC BLOOD PRESSURE: 78 MMHG | BODY MASS INDEX: 31.5 KG/M2 | WEIGHT: 225 LBS | HEIGHT: 71 IN

## 2021-09-30 PROCEDURE — 99204 OFFICE O/P NEW MOD 45 MIN: CPT

## 2021-09-30 PROCEDURE — 73562 X-RAY EXAM OF KNEE 3: CPT | Mod: LT

## 2021-09-30 PROCEDURE — 73502 X-RAY EXAM HIP UNI 2-3 VIEWS: CPT

## 2021-09-30 RX ORDER — TESTOSTERONE CYPIONATE 200 MG/ML
200 INJECTION, SOLUTION INTRAMUSCULAR
Qty: 1 | Refills: 0 | Status: DISCONTINUED | COMMUNITY
Start: 2021-09-15 | End: 2021-09-30

## 2021-09-30 RX ORDER — TADALAFIL 5 MG/1
5 TABLET ORAL
Qty: 30 | Refills: 3 | Status: DISCONTINUED | COMMUNITY
Start: 2019-10-17 | End: 2021-09-30

## 2021-10-19 ENCOUNTER — NON-APPOINTMENT (OUTPATIENT)
Age: 61
End: 2021-10-19

## 2021-10-19 ENCOUNTER — APPOINTMENT (OUTPATIENT)
Dept: INTERNAL MEDICINE | Facility: CLINIC | Age: 61
End: 2021-10-19
Payer: COMMERCIAL

## 2021-10-19 VITALS
RESPIRATION RATE: 16 BRPM | TEMPERATURE: 97.8 F | BODY MASS INDEX: 32.2 KG/M2 | WEIGHT: 230 LBS | HEART RATE: 68 BPM | OXYGEN SATURATION: 97 % | HEIGHT: 71 IN

## 2021-10-19 VITALS — SYSTOLIC BLOOD PRESSURE: 132 MMHG | DIASTOLIC BLOOD PRESSURE: 80 MMHG

## 2021-10-19 DIAGNOSIS — M25.551 PAIN IN RIGHT HIP: ICD-10-CM

## 2021-10-19 PROCEDURE — 99396 PREV VISIT EST AGE 40-64: CPT | Mod: 25

## 2021-10-19 PROCEDURE — 93000 ELECTROCARDIOGRAM COMPLETE: CPT

## 2021-10-19 NOTE — HISTORY OF PRESENT ILLNESS
[de-identified] : Pt comes for adult well visit  Does not want flu shot  Aware that last colonoscopy was 2018  Seeing urology for low T   Just saw ortho about hip but forgot to tell him about knees

## 2021-10-19 NOTE — PHYSICAL EXAM
[No Acute Distress] : no acute distress [Well Nourished] : well nourished [Normal Sclera/Conjunctiva] : normal sclera/conjunctiva [PERRL] : pupils equal round and reactive to light [EOMI] : extraocular movements intact [Normal Outer Ear/Nose] : the outer ears and nose were normal in appearance [Normal Oropharynx] : the oropharynx was normal [No JVD] : no jugular venous distention [No Lymphadenopathy] : no lymphadenopathy [Supple] : supple [No Respiratory Distress] : no respiratory distress  [No Accessory Muscle Use] : no accessory muscle use [Clear to Auscultation] : lungs were clear to auscultation bilaterally [Normal Rate] : normal rate  [Regular Rhythm] : with a regular rhythm [No Carotid Bruits] : no carotid bruits [No Edema] : there was no peripheral edema [No Extremity Clubbing/Cyanosis] : no extremity clubbing/cyanosis [Soft] : abdomen soft [Non Tender] : non-tender [Non-distended] : non-distended [No Masses] : no abdominal mass palpated [No Focal Deficits] : no focal deficits [Speech Grossly Normal] : speech grossly normal [Memory Grossly Normal] : memory grossly normal [de-identified] : stiff in movements due to arthritis

## 2021-10-19 NOTE — REVIEW OF SYSTEMS
[Joint Pain] : joint pain [Joint Stiffness] : joint stiffness [Back Pain] : back pain [Fever] : no fever [Chills] : no chills [Discharge] : no discharge [Vision Problems] : no vision problems [Earache] : no earache [Sore Throat] : no sore throat [Hoarseness] : no hoarseness [Chest Pain] : no chest pain [Palpitations] : no palpitations [Lower Ext Edema] : no lower extremity edema [Shortness Of Breath] : no shortness of breath [Wheezing] : no wheezing [Cough] : no cough [Abdominal Pain] : no abdominal pain [Dysuria] : no dysuria [Mole Changes] : no mole changes [Headache] : no headache [Dizziness] : no dizziness [Fainting] : no fainting [Confusion] : no confusion [Unsteady Walk] : no ataxia [Memory Loss] : no memory loss [Depression] : no depression

## 2021-10-20 LAB
ALBUMIN SERPL ELPH-MCNC: 4.7 G/DL
ALP BLD-CCNC: 70 U/L
ALT SERPL-CCNC: 37 U/L
ANION GAP SERPL CALC-SCNC: 18 MMOL/L
APPEARANCE: CLEAR
AST SERPL-CCNC: 29 U/L
BACTERIA: NEGATIVE
BASOPHILS # BLD AUTO: 0.05 K/UL
BASOPHILS NFR BLD AUTO: 0.7 %
BILIRUB SERPL-MCNC: 0.5 MG/DL
BILIRUBIN URINE: NEGATIVE
BLOOD URINE: NEGATIVE
BUN SERPL-MCNC: 15 MG/DL
CALCIUM SERPL-MCNC: 10 MG/DL
CHLORIDE SERPL-SCNC: 102 MMOL/L
CHOLEST SERPL-MCNC: 223 MG/DL
CO2 SERPL-SCNC: 19 MMOL/L
COLOR: YELLOW
CREAT SERPL-MCNC: 0.98 MG/DL
EOSINOPHIL # BLD AUTO: 0.14 K/UL
EOSINOPHIL NFR BLD AUTO: 2 %
GLUCOSE QUALITATIVE U: NEGATIVE
GLUCOSE SERPL-MCNC: 87 MG/DL
HCT VFR BLD CALC: 47.4 %
HDLC SERPL-MCNC: 66 MG/DL
HGB BLD-MCNC: 15.2 G/DL
HYALINE CASTS: 0 /LPF
IMM GRANULOCYTES NFR BLD AUTO: 0.4 %
KETONES URINE: NEGATIVE
LDLC SERPL CALC-MCNC: 120 MG/DL
LEUKOCYTE ESTERASE URINE: NEGATIVE
LYMPHOCYTES # BLD AUTO: 1.55 K/UL
LYMPHOCYTES NFR BLD AUTO: 21.8 %
MAN DIFF?: NORMAL
MCHC RBC-ENTMCNC: 29.5 PG
MCHC RBC-ENTMCNC: 32.1 GM/DL
MCV RBC AUTO: 91.9 FL
MICROSCOPIC-UA: NORMAL
MONOCYTES # BLD AUTO: 0.57 K/UL
MONOCYTES NFR BLD AUTO: 8 %
NEUTROPHILS # BLD AUTO: 4.77 K/UL
NEUTROPHILS NFR BLD AUTO: 67.1 %
NITRITE URINE: NEGATIVE
NONHDLC SERPL-MCNC: 157 MG/DL
PH URINE: 5.5
PLATELET # BLD AUTO: 250 K/UL
POTASSIUM SERPL-SCNC: 4.3 MMOL/L
PROT SERPL-MCNC: 7.1 G/DL
PROTEIN URINE: NEGATIVE
PSA SERPL-MCNC: 0.78 NG/ML
RBC # BLD: 5.16 M/UL
RBC # FLD: 13.1 %
RED BLOOD CELLS URINE: 0 /HPF
SODIUM SERPL-SCNC: 139 MMOL/L
SPECIFIC GRAVITY URINE: 1.03
SQUAMOUS EPITHELIAL CELLS: 0 /HPF
T4 FREE SERPL-MCNC: 1.3 NG/DL
TRIGL SERPL-MCNC: 183 MG/DL
TSH SERPL-ACNC: 1.69 UIU/ML
UROBILINOGEN URINE: NORMAL
WBC # FLD AUTO: 7.11 K/UL
WHITE BLOOD CELLS URINE: 0 /HPF

## 2021-11-01 ENCOUNTER — APPOINTMENT (OUTPATIENT)
Dept: NEUROSURGERY | Facility: CLINIC | Age: 61
End: 2021-11-01
Payer: COMMERCIAL

## 2021-11-01 VITALS
HEIGHT: 71 IN | SYSTOLIC BLOOD PRESSURE: 134 MMHG | WEIGHT: 230 LBS | TEMPERATURE: 96.3 F | DIASTOLIC BLOOD PRESSURE: 84 MMHG | OXYGEN SATURATION: 98 % | HEART RATE: 70 BPM | BODY MASS INDEX: 32.2 KG/M2

## 2021-11-01 PROCEDURE — 99214 OFFICE O/P EST MOD 30 MIN: CPT

## 2021-11-01 NOTE — CONSULT LETTER
[Dear  ___] : Dear  [unfilled], [Courtesy Letter:] : I had the pleasure of seeing your patient, [unfilled], in my office today. [Sincerely,] : Sincerely, [FreeTextEntry2] : Sushil Monet MD\par [address]\par [facility] [place] [id number] \par \par  [FreeTextEntry1] : Orion Valladares is a 59-year-old who presents today with difficulties with swallowing. Patient is well-known to our office. He had previously presented to our office with neck pain and difficulties with swallowing. Imaging revealed a significant degenerative spondylosis with ankylosing spondylitis pattern. He was found to have large degenerative osteophytes creating mass-effect on his esophagus and trachea especially at C2-3 and C3-4. Patient therefore underwent anterior cervical approach for removal of these osteophytes on December 21, 2018.  Patient had recovered well.  Unfortunately patient indicates he recently started experiencing difficulty with swallowing once again.  He reports he he has been coughing with eating.  No particular food consistencies exacerbates this.  He denies any choking or voice quality changes.  He does indicate a buildup of saliva.  He denies any neck pain however does report decreased range of motion.  He denies upper extremity paresthesias.  He does indicate bilateral upper extremity weakness.  He denies any shooting pains.  He denies any difficulties with dexterity or coordination difficulties in regards to walking.  He  repors difficulties with walking however he is scheduled to undergo left hip replacement for the second time.  He reports dropping of objects from bilateral hands.  He denies any bowel or bladder dysfunction.    There is no imaging to review with the patient.  Patient is alert and oriented.  No distress noted.  Negative Flakita's.  Negative lhermittes sign.  Decreased cervical range of motion noted.  Strength to bilateral upper extremities 5/5.  Reflexes to upper extremities present and equal.  Sensation to temperature and pinprick to bilateral arms equal and normal.  Patient is walking with an antalgic gait due to left hip.  Voice quality appears normal.  I provided the patient with a prescription for a CT and MRI of the cervical spine considering history of significant degenerative spondylosis with ankylosing spondylitis pattern in her cervical spine.  We will follow-up in office to review imaging.  Patient is aware to call with any further questions or concerns or with any new or worsening symptoms.  [FreeTextEntry3] : Purvi Gonzalez, MSN, FNP-BC\par Nurse Practitioner\par Neurosurgery\par [address] [place] [address], 2nd floor \[place] [address] \par Office: [phone] \par Fax: [phone]\par \par

## 2021-11-18 ENCOUNTER — OUTPATIENT (OUTPATIENT)
Dept: OUTPATIENT SERVICES | Facility: HOSPITAL | Age: 61
LOS: 1 days | End: 2021-11-18
Payer: COMMERCIAL

## 2021-11-18 VITALS
RESPIRATION RATE: 16 BRPM | OXYGEN SATURATION: 98 % | DIASTOLIC BLOOD PRESSURE: 66 MMHG | HEIGHT: 72 IN | WEIGHT: 229.94 LBS | SYSTOLIC BLOOD PRESSURE: 109 MMHG | HEART RATE: 68 BPM | TEMPERATURE: 97 F

## 2021-11-18 DIAGNOSIS — F10.10 ALCOHOL ABUSE, UNCOMPLICATED: ICD-10-CM

## 2021-11-18 DIAGNOSIS — Z90.49 ACQUIRED ABSENCE OF OTHER SPECIFIED PARTS OF DIGESTIVE TRACT: Chronic | ICD-10-CM

## 2021-11-18 DIAGNOSIS — Z98.890 OTHER SPECIFIED POSTPROCEDURAL STATES: Chronic | ICD-10-CM

## 2021-11-18 DIAGNOSIS — M16.12 UNILATERAL PRIMARY OSTEOARTHRITIS, LEFT HIP: ICD-10-CM

## 2021-11-18 DIAGNOSIS — Z01.818 ENCOUNTER FOR OTHER PREPROCEDURAL EXAMINATION: ICD-10-CM

## 2021-11-18 DIAGNOSIS — Z96.641 PRESENCE OF RIGHT ARTIFICIAL HIP JOINT: Chronic | ICD-10-CM

## 2021-11-18 DIAGNOSIS — Z90.89 ACQUIRED ABSENCE OF OTHER ORGANS: Chronic | ICD-10-CM

## 2021-11-18 LAB
A1C WITH ESTIMATED AVERAGE GLUCOSE RESULT: 5.5 % — SIGNIFICANT CHANGE UP (ref 4–5.6)
ALBUMIN SERPL ELPH-MCNC: 3.8 G/DL — SIGNIFICANT CHANGE UP (ref 3.3–5)
ALP SERPL-CCNC: 61 U/L — SIGNIFICANT CHANGE UP (ref 30–120)
ALT FLD-CCNC: 45 U/L DA — SIGNIFICANT CHANGE UP (ref 10–60)
ANION GAP SERPL CALC-SCNC: 11 MMOL/L — SIGNIFICANT CHANGE UP (ref 5–17)
APTT BLD: 32.7 SEC — SIGNIFICANT CHANGE UP (ref 27.5–35.5)
AST SERPL-CCNC: 26 U/L — SIGNIFICANT CHANGE UP (ref 10–40)
BILIRUB SERPL-MCNC: 0.7 MG/DL — SIGNIFICANT CHANGE UP (ref 0.2–1.2)
BLD GP AB SCN SERPL QL: SIGNIFICANT CHANGE UP
BUN SERPL-MCNC: 17 MG/DL — SIGNIFICANT CHANGE UP (ref 7–23)
CALCIUM SERPL-MCNC: 8.8 MG/DL — SIGNIFICANT CHANGE UP (ref 8.4–10.5)
CHLORIDE SERPL-SCNC: 106 MMOL/L — SIGNIFICANT CHANGE UP (ref 96–108)
CO2 SERPL-SCNC: 25 MMOL/L — SIGNIFICANT CHANGE UP (ref 22–31)
CREAT SERPL-MCNC: 0.83 MG/DL — SIGNIFICANT CHANGE UP (ref 0.5–1.3)
ESTIMATED AVERAGE GLUCOSE: 111 MG/DL — SIGNIFICANT CHANGE UP (ref 68–114)
GLUCOSE SERPL-MCNC: 94 MG/DL — SIGNIFICANT CHANGE UP (ref 70–99)
HCT VFR BLD CALC: 43.4 % — SIGNIFICANT CHANGE UP (ref 39–50)
HGB BLD-MCNC: 14.7 G/DL — SIGNIFICANT CHANGE UP (ref 13–17)
INR BLD: 0.97 RATIO — SIGNIFICANT CHANGE UP (ref 0.88–1.16)
MCHC RBC-ENTMCNC: 29.2 PG — SIGNIFICANT CHANGE UP (ref 27–34)
MCHC RBC-ENTMCNC: 33.9 GM/DL — SIGNIFICANT CHANGE UP (ref 32–36)
MCV RBC AUTO: 86.3 FL — SIGNIFICANT CHANGE UP (ref 80–100)
NRBC # BLD: 0 /100 WBCS — SIGNIFICANT CHANGE UP (ref 0–0)
PLATELET # BLD AUTO: 224 K/UL — SIGNIFICANT CHANGE UP (ref 150–400)
POTASSIUM SERPL-MCNC: 3.9 MMOL/L — SIGNIFICANT CHANGE UP (ref 3.5–5.3)
POTASSIUM SERPL-SCNC: 3.9 MMOL/L — SIGNIFICANT CHANGE UP (ref 3.5–5.3)
PROT SERPL-MCNC: 7 G/DL — SIGNIFICANT CHANGE UP (ref 6–8.3)
PROTHROM AB SERPL-ACNC: 11.8 SEC — SIGNIFICANT CHANGE UP (ref 10.6–13.6)
RBC # BLD: 5.03 M/UL — SIGNIFICANT CHANGE UP (ref 4.2–5.8)
RBC # FLD: 12.4 % — SIGNIFICANT CHANGE UP (ref 10.3–14.5)
SODIUM SERPL-SCNC: 142 MMOL/L — SIGNIFICANT CHANGE UP (ref 135–145)
WBC # BLD: 5.03 K/UL — SIGNIFICANT CHANGE UP (ref 3.8–10.5)
WBC # FLD AUTO: 5.03 K/UL — SIGNIFICANT CHANGE UP (ref 3.8–10.5)

## 2021-11-18 PROCEDURE — 93010 ELECTROCARDIOGRAM REPORT: CPT

## 2021-11-18 PROCEDURE — 93005 ELECTROCARDIOGRAM TRACING: CPT

## 2021-11-18 PROCEDURE — G0463: CPT

## 2021-11-18 RX ORDER — DULOXETINE HYDROCHLORIDE 30 MG/1
1 CAPSULE, DELAYED RELEASE ORAL
Qty: 0 | Refills: 0 | DISCHARGE

## 2021-11-18 NOTE — H&P PST ADULT - MUSCULOSKELETAL
details… detailed exam no joint swelling/no joint warmth/no calf tenderness/decreased ROM due to pain

## 2021-11-18 NOTE — H&P PST ADULT - NSICDXPASTMEDICALHX_GEN_ALL_CORE_FT
PAST MEDICAL HISTORY:  ADHD (attention deficit hyperactivity disorder)     Ankylosis cervical    Class 1 obesity with body mass index (BMI) of 31.0 to 31.9 in adult     Hiatal hernia     Hypothyroidism     Lumbar disc herniation L4-5, intermittent right lower leg tingling    Lung nodule right lower quadrant ; let lung x 2    Lymphadenopathy, thoracic     Mild alcohol use disorder     Muscle atrophy right leg    Osteoarthritis     Osteoarthritis of left hip     Polyp of colon

## 2021-11-18 NOTE — H&P PST ADULT - PROBLEM SELECTOR PLAN 2
Alcohol use and CAGE questionnaire were discussed extensively.  Patient stated that he desires to stop drinking alcohol completely and has sought care from psychologist who recommended increasing duloxetine dose and gabapentin.  patient states that he is not physically dependant on alcohol and does nor drink alcohol daily and is able to withold alcohol for "days".  Social work referral was declined; I advised the patient to discuss further with Dr Robison.

## 2021-11-18 NOTE — H&P PST ADULT - PROBLEM SELECTOR PLAN 1
Left hip replacement is scheduled for 12/8/2021  Covid testing is scheduled for 12/6/2021 @ [facility].  medical clearance was requested; lung CT scan (2021) was requested to be forwarded with medical clearance  Pre op instructions were reviewed; contact info given; best wishes offered.

## 2021-11-18 NOTE — H&P PST ADULT - NSICDXPASTSURGICALHX_GEN_ALL_CORE_FT
PAST SURGICAL HISTORY:  H/O cervical spine surgery cervical osteophyte removal 2019    H/O colonoscopy removal of polyp    History of colon resection 2019    History of hip replacement, total, right 2018    S/P T&A (status post tonsillectomy and adenoidectomy) as child

## 2021-11-18 NOTE — H&P PST ADULT - HISTORY OF PRESENT ILLNESS
62 yo male reports chronic left hip pain for many years which is recently exacerbated for the last 5 months.  Pain in left hip is constant and rates 9/10 when he is weight bearing. He also reports bilateral knee pain and lumbar spine pain and cervical pain which is chronic.  He is scheduled for left total anterior hip replacement on 12/8/2021 at [facility].   Bcc Histology Text: There were numerous aggregates of basaloid cells.

## 2021-11-19 LAB
MRSA PCR RESULT.: SIGNIFICANT CHANGE UP
S AUREUS DNA NOSE QL NAA+PROBE: DETECTED

## 2021-11-19 RX ORDER — MUPIROCIN 20 MG/G
1 OINTMENT TOPICAL
Qty: 1 | Refills: 0
Start: 2021-11-19 | End: 2021-11-23

## 2021-11-24 PROBLEM — M16.12 UNILATERAL PRIMARY OSTEOARTHRITIS, LEFT HIP: Chronic | Status: ACTIVE | Noted: 2021-11-18

## 2021-11-24 PROBLEM — F10.10 ALCOHOL ABUSE, UNCOMPLICATED: Chronic | Status: ACTIVE | Noted: 2021-11-18

## 2021-11-24 PROBLEM — E03.9 HYPOTHYROIDISM, UNSPECIFIED: Chronic | Status: ACTIVE | Noted: 2021-11-18

## 2021-11-24 PROBLEM — E66.9 OBESITY, UNSPECIFIED: Chronic | Status: ACTIVE | Noted: 2021-11-18

## 2021-11-29 ENCOUNTER — APPOINTMENT (OUTPATIENT)
Dept: INTERNAL MEDICINE | Facility: CLINIC | Age: 61
End: 2021-11-29
Payer: COMMERCIAL

## 2021-11-29 VITALS
HEIGHT: 71 IN | RESPIRATION RATE: 16 BRPM | TEMPERATURE: 97.8 F | WEIGHT: 235.4 LBS | HEART RATE: 86 BPM | OXYGEN SATURATION: 96 % | BODY MASS INDEX: 32.96 KG/M2

## 2021-11-29 VITALS — SYSTOLIC BLOOD PRESSURE: 132 MMHG | DIASTOLIC BLOOD PRESSURE: 82 MMHG

## 2021-11-29 DIAGNOSIS — Z01.818 ENCOUNTER FOR OTHER PREPROCEDURAL EXAMINATION: ICD-10-CM

## 2021-11-29 DIAGNOSIS — M16.12 UNILATERAL PRIMARY OSTEOARTHRITIS, LEFT HIP: ICD-10-CM

## 2021-11-29 PROCEDURE — 99214 OFFICE O/P EST MOD 30 MIN: CPT

## 2021-11-29 NOTE — PHYSICAL EXAM
[No Acute Distress] : no acute distress [Normal Sclera/Conjunctiva] : normal sclera/conjunctiva [Normal Outer Ear/Nose] : the outer ears and nose were normal in appearance [No JVD] : no jugular venous distention [No Lymphadenopathy] : no lymphadenopathy [No Respiratory Distress] : no respiratory distress  [No Accessory Muscle Use] : no accessory muscle use [Clear to Auscultation] : lungs were clear to auscultation bilaterally [Normal Rate] : normal rate  [Regular Rhythm] : with a regular rhythm [No Edema] : there was no peripheral edema [No Extremity Clubbing/Cyanosis] : no extremity clubbing/cyanosis

## 2021-12-06 ENCOUNTER — OUTPATIENT (OUTPATIENT)
Dept: OUTPATIENT SERVICES | Facility: HOSPITAL | Age: 61
LOS: 1 days | End: 2021-12-06
Payer: COMMERCIAL

## 2021-12-06 DIAGNOSIS — Z90.49 ACQUIRED ABSENCE OF OTHER SPECIFIED PARTS OF DIGESTIVE TRACT: Chronic | ICD-10-CM

## 2021-12-06 DIAGNOSIS — Z20.828 CONTACT WITH AND (SUSPECTED) EXPOSURE TO OTHER VIRAL COMMUNICABLE DISEASES: ICD-10-CM

## 2021-12-06 DIAGNOSIS — Z98.890 OTHER SPECIFIED POSTPROCEDURAL STATES: Chronic | ICD-10-CM

## 2021-12-06 DIAGNOSIS — Z90.89 ACQUIRED ABSENCE OF OTHER ORGANS: Chronic | ICD-10-CM

## 2021-12-06 DIAGNOSIS — Z96.641 PRESENCE OF RIGHT ARTIFICIAL HIP JOINT: Chronic | ICD-10-CM

## 2021-12-06 LAB — SARS-COV-2 RNA SPEC QL NAA+PROBE: SIGNIFICANT CHANGE UP

## 2021-12-06 PROCEDURE — U0003: CPT

## 2021-12-06 PROCEDURE — U0005: CPT

## 2021-12-07 NOTE — ASU PATIENT PROFILE, ADULT - FALL HARM RISK - UNIVERSAL INTERVENTIONS
Bed in lowest position, wheels locked, appropriate side rails in place/Call bell, personal items and telephone in reach/Instruct patient to call for assistance before getting out of bed or chair/Non-slip footwear when patient is out of bed/[place] to call system/Physically safe environment - no spills, clutter or unnecessary equipment/Purposeful Proactive Rounding/Room/bathroom lighting operational, light cord in reach

## 2021-12-07 NOTE — ASU PATIENT PROFILE, ADULT - DOMESTIC TRAVEL HIGH RISK QUESTION
Patient: Jannette Onofre    Procedure Summary     Date:  11/08/19 Room / Location:   [facility]    Anesthesia Start:  1703 Anesthesia Stop:  1903    Procedures:       GASTRIC SLEEVE LAPAROSCOPIC (N/A Abdomen)      ESOPHAGOGASTRODUODENOSCOPY (N/A Esophagus) Diagnosis:       Morbid obesity with body mass index (BMI) of 40.0 to 44.9 in adult (CMS/[facility])      (Morbid obesity with body mass index (BMI) of 40.0 to 44.9 in adult (CMS/[facility]) [E66.01, Z68.41])    Surgeon:  Ranjeet Dunbar MD Provider:  García Bowden MD    Anesthesia Type:  general ASA Status:  3          Anesthesia Type: general  Last vitals  BP   136/90 (11/08/19 1316)   Temp   97.6 °F (36.4 °C) (11/08/19 1316)   Pulse   81 (11/08/19 1316)   Resp   18 (11/08/19 1316)     SpO2   96 % (11/08/19 1316)     Post Anesthesia Care and Evaluation    Patient location during evaluation: PACU  Patient participation: complete - patient participated  Level of consciousness: awake and alert  Pain score: 0  Pain management: adequate  Airway patency: patent  Anesthetic complications: No anesthetic complications  PONV Status: none  Cardiovascular status: hemodynamically stable and acceptable  Respiratory status: nonlabored ventilation, acceptable and nasal cannula  Hydration status: acceptable    Comments: PATIENT ARRIVED TO PACU WITH HYPOVENTILATION, SAT IN THE 80S AND HYPOTENSION.  TREATED WITH EPHEDRINE 10MG, FLUIDS, BAG VALVE MASK VENTILATION WITH 100% O2 AND 40MCG NARCAN.  PATIENT IMPROVED, WOKE UP AND SATS INCREASED TO 90S.       No

## 2021-12-07 NOTE — ASU PATIENT PROFILE, ADULT - NSICDXPASTMEDICALHX_GEN_ALL_CORE_FT
PAST MEDICAL HISTORY:  ADHD (attention deficit hyperactivity disorder)     Ankylosis cervical    Class 1 obesity with body mass index (BMI) of 31.0 to 31.9 in adult     Hiatal hernia     Hypothyroidism     Lumbar disc herniation L4-5, intermittent right lower leg tingling    Lung nodule right lower quadrant ; let lung x 2    Lymphadenopathy, thoracic     Mild alcohol use disorder     Muscle atrophy right leg    Osteoarthritis     Osteoarthritis of left hip     Osteoarthritis of left hip     Polyp of colon

## 2021-12-08 ENCOUNTER — RESULT REVIEW (OUTPATIENT)
Age: 61
End: 2021-12-08

## 2021-12-08 ENCOUNTER — TRANSCRIPTION ENCOUNTER (OUTPATIENT)
Age: 61
End: 2021-12-08

## 2021-12-08 ENCOUNTER — APPOINTMENT (OUTPATIENT)
Dept: ORTHOPEDIC SURGERY | Facility: HOSPITAL | Age: 61
End: 2021-12-08

## 2021-12-08 ENCOUNTER — OUTPATIENT (OUTPATIENT)
Dept: OUTPATIENT SERVICES | Facility: HOSPITAL | Age: 61
LOS: 1 days | End: 2021-12-08
Payer: COMMERCIAL

## 2021-12-08 VITALS
WEIGHT: 223.77 LBS | HEIGHT: 69 IN | TEMPERATURE: 97 F | HEART RATE: 60 BPM | DIASTOLIC BLOOD PRESSURE: 80 MMHG | RESPIRATION RATE: 17 BRPM | SYSTOLIC BLOOD PRESSURE: 145 MMHG | OXYGEN SATURATION: 100 %

## 2021-12-08 DIAGNOSIS — G47.00 INSOMNIA, UNSPECIFIED: ICD-10-CM

## 2021-12-08 DIAGNOSIS — Z01.818 ENCOUNTER FOR OTHER PREPROCEDURAL EXAMINATION: ICD-10-CM

## 2021-12-08 DIAGNOSIS — E03.9 HYPOTHYROIDISM, UNSPECIFIED: ICD-10-CM

## 2021-12-08 DIAGNOSIS — Z90.89 ACQUIRED ABSENCE OF OTHER ORGANS: Chronic | ICD-10-CM

## 2021-12-08 DIAGNOSIS — Z96.641 PRESENCE OF RIGHT ARTIFICIAL HIP JOINT: Chronic | ICD-10-CM

## 2021-12-08 DIAGNOSIS — M16.12 UNILATERAL PRIMARY OSTEOARTHRITIS, LEFT HIP: ICD-10-CM

## 2021-12-08 DIAGNOSIS — Z98.890 OTHER SPECIFIED POSTPROCEDURAL STATES: Chronic | ICD-10-CM

## 2021-12-08 DIAGNOSIS — Z90.49 ACQUIRED ABSENCE OF OTHER SPECIFIED PARTS OF DIGESTIVE TRACT: Chronic | ICD-10-CM

## 2021-12-08 LAB
ANION GAP SERPL CALC-SCNC: 9 MMOL/L — SIGNIFICANT CHANGE UP (ref 5–17)
BUN SERPL-MCNC: 17 MG/DL — SIGNIFICANT CHANGE UP (ref 7–23)
CALCIUM SERPL-MCNC: 8.8 MG/DL — SIGNIFICANT CHANGE UP (ref 8.4–10.5)
CHLORIDE SERPL-SCNC: 100 MMOL/L — SIGNIFICANT CHANGE UP (ref 96–108)
CO2 SERPL-SCNC: 25 MMOL/L — SIGNIFICANT CHANGE UP (ref 22–31)
CREAT SERPL-MCNC: 1.19 MG/DL — SIGNIFICANT CHANGE UP (ref 0.5–1.3)
GLUCOSE SERPL-MCNC: 168 MG/DL — HIGH (ref 70–99)
HCT VFR BLD CALC: 42.2 % — SIGNIFICANT CHANGE UP (ref 39–50)
HGB BLD-MCNC: 13.9 G/DL — SIGNIFICANT CHANGE UP (ref 13–17)
POTASSIUM SERPL-MCNC: 4.1 MMOL/L — SIGNIFICANT CHANGE UP (ref 3.5–5.3)
POTASSIUM SERPL-SCNC: 4.1 MMOL/L — SIGNIFICANT CHANGE UP (ref 3.5–5.3)
SODIUM SERPL-SCNC: 134 MMOL/L — LOW (ref 135–145)

## 2021-12-08 PROCEDURE — 88305 TISSUE EXAM BY PATHOLOGIST: CPT | Mod: 26

## 2021-12-08 PROCEDURE — 27130 TOTAL HIP ARTHROPLASTY: CPT | Mod: AS,LT

## 2021-12-08 PROCEDURE — 99204 OFFICE O/P NEW MOD 45 MIN: CPT

## 2021-12-08 PROCEDURE — 88311 DECALCIFY TISSUE: CPT | Mod: 26

## 2021-12-08 PROCEDURE — 27130 TOTAL HIP ARTHROPLASTY: CPT | Mod: LT

## 2021-12-08 RX ORDER — LEVOTHYROXINE SODIUM 125 MCG
175 TABLET ORAL DAILY
Refills: 0 | Status: DISCONTINUED | OUTPATIENT
Start: 2021-12-08 | End: 2021-12-22

## 2021-12-08 RX ORDER — TRANEXAMIC ACID 100 MG/ML
1000 INJECTION, SOLUTION INTRAVENOUS ONCE
Refills: 0 | Status: COMPLETED | OUTPATIENT
Start: 2021-12-08 | End: 2021-12-08

## 2021-12-08 RX ORDER — ONDANSETRON 8 MG/1
4 TABLET, FILM COATED ORAL ONCE
Refills: 0 | Status: DISCONTINUED | OUTPATIENT
Start: 2021-12-08 | End: 2021-12-08

## 2021-12-08 RX ORDER — CELECOXIB 200 MG/1
200 CAPSULE ORAL EVERY 12 HOURS
Refills: 0 | Status: DISCONTINUED | OUTPATIENT
Start: 2021-12-08 | End: 2021-12-22

## 2021-12-08 RX ORDER — DULOXETINE HYDROCHLORIDE 30 MG/1
2 CAPSULE, DELAYED RELEASE ORAL
Qty: 0 | Refills: 0 | DISCHARGE

## 2021-12-08 RX ORDER — OXYCODONE HYDROCHLORIDE 5 MG/1
10 TABLET ORAL
Refills: 0 | Status: DISCONTINUED | OUTPATIENT
Start: 2021-12-08 | End: 2021-12-09

## 2021-12-08 RX ORDER — SODIUM CHLORIDE 9 MG/ML
500 INJECTION INTRAMUSCULAR; INTRAVENOUS; SUBCUTANEOUS ONCE
Refills: 0 | Status: COMPLETED | OUTPATIENT
Start: 2021-12-08 | End: 2021-12-08

## 2021-12-08 RX ORDER — LANOLIN ALCOHOL/MO/W.PET/CERES
10 CREAM (GRAM) TOPICAL AT BEDTIME
Refills: 0 | Status: DISCONTINUED | OUTPATIENT
Start: 2021-12-08 | End: 2021-12-22

## 2021-12-08 RX ORDER — ACETAMINOPHEN 500 MG
1000 TABLET ORAL EVERY 8 HOURS
Refills: 0 | Status: COMPLETED | OUTPATIENT
Start: 2021-12-08 | End: 2021-12-08

## 2021-12-08 RX ORDER — SENNA PLUS 8.6 MG/1
2 TABLET ORAL
Qty: 0 | Refills: 0 | DISCHARGE
Start: 2021-12-08

## 2021-12-08 RX ORDER — GABAPENTIN 400 MG/1
300 CAPSULE ORAL THREE TIMES A DAY
Refills: 0 | Status: DISCONTINUED | OUTPATIENT
Start: 2021-12-08 | End: 2021-12-22

## 2021-12-08 RX ORDER — ACETAMINOPHEN 500 MG
1000 TABLET ORAL ONCE
Refills: 0 | Status: COMPLETED | OUTPATIENT
Start: 2021-12-08 | End: 2021-12-08

## 2021-12-08 RX ORDER — POLYETHYLENE GLYCOL 3350 17 G/17G
17 POWDER, FOR SOLUTION ORAL AT BEDTIME
Refills: 0 | Status: DISCONTINUED | OUTPATIENT
Start: 2021-12-08 | End: 2021-12-22

## 2021-12-08 RX ORDER — CHLORHEXIDINE GLUCONATE 213 G/1000ML
1 SOLUTION TOPICAL ONCE
Refills: 0 | Status: COMPLETED | OUTPATIENT
Start: 2021-12-08 | End: 2021-12-08

## 2021-12-08 RX ORDER — ONDANSETRON 8 MG/1
4 TABLET, FILM COATED ORAL EVERY 6 HOURS
Refills: 0 | Status: DISCONTINUED | OUTPATIENT
Start: 2021-12-08 | End: 2021-12-22

## 2021-12-08 RX ORDER — CEFAZOLIN SODIUM 1 G
2000 VIAL (EA) INJECTION ONCE
Refills: 0 | Status: COMPLETED | OUTPATIENT
Start: 2021-12-08 | End: 2021-12-08

## 2021-12-08 RX ORDER — HYDROMORPHONE HYDROCHLORIDE 2 MG/ML
0.5 INJECTION INTRAMUSCULAR; INTRAVENOUS; SUBCUTANEOUS
Refills: 0 | Status: DISCONTINUED | OUTPATIENT
Start: 2021-12-08 | End: 2021-12-08

## 2021-12-08 RX ORDER — ASPIRIN/CALCIUM CARB/MAGNESIUM 324 MG
81 TABLET ORAL EVERY 12 HOURS
Refills: 0 | Status: DISCONTINUED | OUTPATIENT
Start: 2021-12-09 | End: 2021-12-22

## 2021-12-08 RX ORDER — DEXAMETHASONE 0.5 MG/5ML
10 ELIXIR ORAL ONCE
Refills: 0 | Status: COMPLETED | OUTPATIENT
Start: 2021-12-09 | End: 2021-12-09

## 2021-12-08 RX ORDER — APREPITANT 80 MG/1
40 CAPSULE ORAL ONCE
Refills: 0 | Status: COMPLETED | OUTPATIENT
Start: 2021-12-08 | End: 2021-12-08

## 2021-12-08 RX ORDER — GABAPENTIN 400 MG/1
1 CAPSULE ORAL
Qty: 0 | Refills: 0 | DISCHARGE

## 2021-12-08 RX ORDER — SODIUM CHLORIDE 9 MG/ML
1000 INJECTION, SOLUTION INTRAVENOUS
Refills: 0 | Status: DISCONTINUED | OUTPATIENT
Start: 2021-12-08 | End: 2021-12-22

## 2021-12-08 RX ORDER — OXYCODONE HYDROCHLORIDE 5 MG/1
5 TABLET ORAL
Refills: 0 | Status: DISCONTINUED | OUTPATIENT
Start: 2021-12-08 | End: 2021-12-08

## 2021-12-08 RX ORDER — PANTOPRAZOLE SODIUM 20 MG/1
40 TABLET, DELAYED RELEASE ORAL
Refills: 0 | Status: DISCONTINUED | OUTPATIENT
Start: 2021-12-08 | End: 2021-12-22

## 2021-12-08 RX ORDER — ASPIRIN/CALCIUM CARB/MAGNESIUM 324 MG
1 TABLET ORAL
Qty: 83 | Refills: 0
Start: 2021-12-08 | End: 2022-01-18

## 2021-12-08 RX ORDER — CEFAZOLIN SODIUM 1 G
2000 VIAL (EA) INJECTION EVERY 8 HOURS
Refills: 0 | Status: COMPLETED | OUTPATIENT
Start: 2021-12-08 | End: 2021-12-08

## 2021-12-08 RX ORDER — LEVOTHYROXINE SODIUM 125 MCG
1 TABLET ORAL
Qty: 0 | Refills: 0 | DISCHARGE

## 2021-12-08 RX ORDER — POLYETHYLENE GLYCOL 3350 17 G/17G
17 POWDER, FOR SOLUTION ORAL
Qty: 0 | Refills: 0 | DISCHARGE
Start: 2021-12-08

## 2021-12-08 RX ORDER — OMEPRAZOLE 10 MG/1
1 CAPSULE, DELAYED RELEASE ORAL
Qty: 30 | Refills: 1
Start: 2021-12-08 | End: 2022-02-05

## 2021-12-08 RX ORDER — CELECOXIB 200 MG/1
1 CAPSULE ORAL
Qty: 60 | Refills: 0
Start: 2021-12-08 | End: 2022-01-06

## 2021-12-08 RX ORDER — SODIUM CHLORIDE 9 MG/ML
1000 INJECTION, SOLUTION INTRAVENOUS
Refills: 0 | Status: DISCONTINUED | OUTPATIENT
Start: 2021-12-08 | End: 2021-12-08

## 2021-12-08 RX ORDER — SODIUM CHLORIDE 9 MG/ML
1000 INJECTION INTRAMUSCULAR; INTRAVENOUS; SUBCUTANEOUS ONCE
Refills: 0 | Status: COMPLETED | OUTPATIENT
Start: 2021-12-08 | End: 2021-12-08

## 2021-12-08 RX ORDER — LANOLIN ALCOHOL/MO/W.PET/CERES
3 CREAM (GRAM) TOPICAL AT BEDTIME
Refills: 0 | Status: DISCONTINUED | OUTPATIENT
Start: 2021-12-08 | End: 2021-12-08

## 2021-12-08 RX ORDER — ACETAMINOPHEN 500 MG
2 TABLET ORAL
Qty: 0 | Refills: 0 | DISCHARGE
Start: 2021-12-08 | End: 2021-12-22

## 2021-12-08 RX ORDER — SENNA PLUS 8.6 MG/1
2 TABLET ORAL AT BEDTIME
Refills: 0 | Status: DISCONTINUED | OUTPATIENT
Start: 2021-12-08 | End: 2021-12-22

## 2021-12-08 RX ORDER — DULOXETINE HYDROCHLORIDE 30 MG/1
40 CAPSULE, DELAYED RELEASE ORAL DAILY
Refills: 0 | Status: DISCONTINUED | OUTPATIENT
Start: 2021-12-08 | End: 2021-12-22

## 2021-12-08 RX ADMIN — SODIUM CHLORIDE 1000 MILLILITER(S): 9 INJECTION INTRAMUSCULAR; INTRAVENOUS; SUBCUTANEOUS at 17:38

## 2021-12-08 RX ADMIN — SODIUM CHLORIDE 500 MILLILITER(S): 9 INJECTION INTRAMUSCULAR; INTRAVENOUS; SUBCUTANEOUS at 20:19

## 2021-12-08 RX ADMIN — CHLORHEXIDINE GLUCONATE 1 APPLICATION(S): 213 SOLUTION TOPICAL at 06:42

## 2021-12-08 RX ADMIN — OXYCODONE HYDROCHLORIDE 5 MILLIGRAM(S): 5 TABLET ORAL at 15:23

## 2021-12-08 RX ADMIN — Medication 1000 MILLIGRAM(S): at 23:39

## 2021-12-08 RX ADMIN — Medication 100 MILLIGRAM(S): at 23:39

## 2021-12-08 RX ADMIN — SODIUM CHLORIDE 125 MILLILITER(S): 9 INJECTION, SOLUTION INTRAVENOUS at 21:22

## 2021-12-08 RX ADMIN — APREPITANT 40 MILLIGRAM(S): 80 CAPSULE ORAL at 06:41

## 2021-12-08 RX ADMIN — SODIUM CHLORIDE 100 MILLILITER(S): 9 INJECTION, SOLUTION INTRAVENOUS at 15:44

## 2021-12-08 RX ADMIN — OXYCODONE HYDROCHLORIDE 10 MILLIGRAM(S): 5 TABLET ORAL at 23:39

## 2021-12-08 RX ADMIN — Medication 1000 MILLIGRAM(S): at 16:30

## 2021-12-08 RX ADMIN — OXYCODONE HYDROCHLORIDE 5 MILLIGRAM(S): 5 TABLET ORAL at 19:30

## 2021-12-08 RX ADMIN — Medication 400 MILLIGRAM(S): at 15:44

## 2021-12-08 RX ADMIN — CELECOXIB 200 MILLIGRAM(S): 200 CAPSULE ORAL at 21:21

## 2021-12-08 RX ADMIN — SENNA PLUS 2 TABLET(S): 8.6 TABLET ORAL at 21:21

## 2021-12-08 RX ADMIN — Medication 100 MILLIGRAM(S): at 16:29

## 2021-12-08 RX ADMIN — OXYCODONE HYDROCHLORIDE 5 MILLIGRAM(S): 5 TABLET ORAL at 15:53

## 2021-12-08 RX ADMIN — CELECOXIB 200 MILLIGRAM(S): 200 CAPSULE ORAL at 21:24

## 2021-12-08 RX ADMIN — OXYCODONE HYDROCHLORIDE 5 MILLIGRAM(S): 5 TABLET ORAL at 18:50

## 2021-12-08 RX ADMIN — Medication 1000 MILLIGRAM(S): at 23:40

## 2021-12-08 NOTE — PHYSICAL THERAPY INITIAL EVALUATION ADULT - ADDITIONAL COMMENTS
Pt will stay at brothers house upon d/c with 2  steps to enter with handrail, 0 steps inside. Owns no DME. Pt's spouse will assist at home upon d/c. Has stall shower.

## 2021-12-08 NOTE — PHYSICAL THERAPY INITIAL EVALUATION ADULT - ACTIVE RANGE OF MOTION EXAMINATION, REHAB EVAL
Left ankle foot and knee WNL, Left hip decreased due to sx./nico. upper extremity Active ROM was WNL (within normal limits)/RLE Active ROM was WNL (within normal limits)/deficits as listed below

## 2021-12-08 NOTE — ASU PREOP CHECKLIST - ANTIBIOTIC
[Patient reported PAP Smear was normal] : Patient reported PAP Smear was normal [HIV test declined] : HIV test declined [Hepatitis C test offered] : Hepatitis C test offered [MammogramDate] : 07/20 yes [PapSmearDate] : 2015 [ColonoscopyDate] : 10/20

## 2021-12-08 NOTE — DISCHARGE NOTE PROVIDER - PROVIDER TOKENS
PROVIDER:[TOKEN:[[id number]],SCHEDULEDAPPT:[12/23/2021],SCHEDULEDAPPTTIME:[09:00 AM],ESTABLISHEDPATIENT:[T]]

## 2021-12-08 NOTE — DISCHARGE NOTE NURSING/CASE MANAGEMENT/SOCIAL WORK - NSSCNAMETXT_GEN_ALL_CORE
[facility] - [phone]  Nurse to visit the day after hospital discharge; physical therapist to follow. Please contact the home care agency at the above phone number if you have not heard from them by 12 noon on the day after your hospital discharge.

## 2021-12-08 NOTE — CONSULT NOTE ADULT - SUBJECTIVE AND OBJECTIVE BOX
Patient is a 61y old  Male who presents with a chief complaint of Left anterior THR for severe left hip OA.   (08 Dec 2021 13:08)    INTERVAL HPI/OVERNIGHT EVENTS: 61M presented with chronic left hip pain for many years which is recently exacerbated for the last 5 months.  Pain in left hip is constant and rates 9/10 when he is weight bearing. He also reports bilateral knee pain and lumbar spine pain and cervical pain which is chronic.   Patient is now s/p left Ant THR.  He reports the hip feels ok but he has had a headache.     REVIEW OF SYSTEMS:  CONSTITUTIONAL: No fever, weight loss, or fatigue  EYES: No eye pain, visual disturbances, or discharge  ENMT:  No difficulty hearing, tinnitus, vertigo; No sinus or throat pain  NECK: No pain or stiffness  BREASTS: No pain, masses, or nipple discharge  RESPIRATORY: No cough, wheezing, chills or hemoptysis; No shortness of breath  CARDIOVASCULAR: No chest pain, palpitations, dizziness, or leg swelling  GASTROINTESTINAL: No abdominal or epigastric pain. No nausea, vomiting, or hematemesis; No diarrhea or constipation. No melena or hematochezia.  GENITOURINARY: No dysuria, frequency, hematuria, or incontinence  NEUROLOGICAL: No  memory loss, loss of strength, numbness, or tremors  SKIN: No itching, burning, rashes, or lesions   LYMPH NODES: No enlarged glands  ENDOCRINE: No heat or cold intolerance; No hair loss  MUSCULOSKELETAL: No muscle or back pain  PSYCHIATRIC: No depression, anxiety, mood swings, or difficulty sleeping  HEME/LYMPH: No easy bruising, or bleeding gums  ALLERGY AND IMMUNOLOGIC: No hives or eczema    PAST MEDICAL & SURGICAL HISTORY:  Lumbar disc herniation  L4-5, intermittent right lower leg tingling    Muscle atrophy  right leg    Osteoarthritis    ADHD (attention deficit hyperactivity disorder)    Ankylosis  cervical    Hiatal hernia    Polyp of colon    Lung nodule  right lower quadrant ; let lung x 2    Lymphadenopathy, thoracic    Hypothyroidism    Mild alcohol use disorder    Class 1 obesity with body mass index (BMI) of 31.0 to 31.9 in adult    Osteoarthritis of left hip    Osteoarthritis of left hip    S/P T&amp;A (status post tonsillectomy and adenoidectomy)  as child    History of hip replacement, total, right  2018    H/O colonoscopy  removal of polyp    History of colon resection  2019    H/O cervical spine surgery  cervical osteophyte removal 2019        SOCIAL HISTORY:  Residence: [ ] [facility]  [ ] SNF  [x ] Community  [ ] Substance abuse: denies  [ ] Tobacco: never smoker  [ ] Alcohol use: several beers several times a week.    Allergies  No Known Allergies    MEDICATIONS  (STANDING):  acetaminophen     Tablet .. 1000 milliGRAM(s) Oral every 8 hours  ceFAZolin   IVPB 2000 milliGRAM(s) IV Intermittent every 8 hours  celecoxib 200 milliGRAM(s) Oral every 12 hours  DULoxetine 40 milliGRAM(s) Oral daily  gabapentin 300 milliGRAM(s) Oral three times a day  lactated ringers. 1000 milliLiter(s) (125 mL/Hr) IV Continuous <Continuous>  levothyroxine 175 MICROGram(s) Oral daily  melatonin 3 milliGRAM(s) Oral at bedtime  pantoprazole    Tablet 40 milliGRAM(s) Oral before breakfast  polyethylene glycol 3350 17 Gram(s) Oral at bedtime  senna 2 Tablet(s) Oral at bedtime  sodium chloride 0.9% Bolus 500 milliLiter(s) IV Bolus once    MEDICATIONS  (PRN):  aluminum hydroxide/magnesium hydroxide/simethicone Suspension 30 milliLiter(s) Oral four times a day PRN Indigestion  HYDROmorphone  Injectable 0.5 milliGRAM(s) IV Push every 3 hours PRN breakthrough pain  ondansetron Injectable 4 milliGRAM(s) IV Push every 6 hours PRN Nausea and/or Vomiting  oxyCODONE    IR 5 milliGRAM(s) Oral every 3 hours PRN Moderate Pain (4 - 6)  oxyCODONE    IR 10 milliGRAM(s) Oral every 3 hours PRN Severe Pain (7 - 10)      FAMILY HISTORY:  Family history of throat cancer  Family history of peripheral vascular disease        Vital Signs Last 24 Hrs  T(C): 36.9 (08 Dec 2021 15:54), Max: 36.9 (08 Dec 2021 15:54)  T(F): 98.5 (08 Dec 2021 15:54), Max: 98.5 (08 Dec 2021 15:54)  HR: 69 (08 Dec 2021 15:54) (60 - 72)  BP: 117/68 (08 Dec 2021 15:54) (100/53 - 145/80)  BP(mean): --  RR: 18 (08 Dec 2021 15:54) (11 - 20)  SpO2: 96% (08 Dec 2021 15:54) (96% - 100%)    PHYSICAL EXAM:    GENERAL: NAD, well-groomed, well-developed  HEAD:  Atraumatic, Normocephalic  EYES:  conjunctiva and sclera clear  ENMT:  Dry mucous membranes, Good dentition, No lesions  NECK: Supple, No JVD  NERVOUS SYSTEM:  Alert & Oriented X3, Good concentration; Moving all 4 extremities; No gross sensory deficits  CHEST/LUNG: Clear to auscultation bilaterally; No rales, rhonchi, wheezing, or rubs  HEART: Regular rate and rhythm; No murmurs, rubs, or gallops  ABDOMEN: Soft, Nontender, Nondistended; Bowel sounds present  EXTREMITIES:  2+ Peripheral Pulses, No clubbing, cyanosis, or edema  LYMPH: No lymphadenopathy noted  /RECTAL: Not examined  BREAST: Not examined  SKIN: No rashes or lesions  INCISION: dressing dry and intact    LABS:                        13.9   x     )-----------( x        ( 08 Dec 2021 17:07 )             42.2     12-08    134<L>  |  100  |  17  ----------------------------<  168<H>  4.1   |  25  |  1.19    Ca    8.8      08 Dec 2021 17:07          CAPILLARY BLOOD GLUCOSE          RADIOLOGY & ADDITIONAL STUDIES:    EKG: sinus bradycardia  Personally Reviewed:  [ x] YES     Imaging: intraop fluoro reviewed  Personally Reviewed:  [x ] YES     Consultant(s) Notes Reviewed:  pre-op med clearance reviewed    Care Discussed with Consultants/Other Providers:

## 2021-12-08 NOTE — DISCHARGE NOTE PROVIDER - CARE PROVIDER_API CALL
Helena Pedroza (NP; RN)  NP in Adult Health  [address], [address]  [place] [address]  Phone: [phone]  Fax: [phone]  Established Patient  Scheduled Appointment: 12/23/2021 09:00 AM

## 2021-12-08 NOTE — DISCHARGE NOTE PROVIDER - NSDCMRMEDTOKEN_GEN_ALL_CORE_FT
3:1 Commode: Dx: s/p Left Anterior THR  acetaminophen 500 mg oral tablet: 2 tab(s) orally every 8 hours  aspirin 81 mg oral delayed release tablet: 1 tab orally every 12 hours. Take 2 hours before Celebrex.   celecoxib 200 mg oral capsule: 1 cap orally every 12 hours. Take 2 hours after Aspirin.  Cymbalta 20 mg oral delayed release capsule: 2 tab(s) orally once a day  gabapentin 300 mg oral capsule: 1 cap(s) orally 3 times a day  levothyroxine 175 mcg (0.175 mg) oral tablet: 1 tab(s) orally once a day  meloxicam 10 mg oral capsule: 1 cap(s) orally once a day, As Needed  multivitamin: orally once a day  mupirocin 2% topical ointment: 1 application in each nostril 2 times a day   omeprazole 20 mg oral delayed release capsule: 1 cap orally once a day   polyethylene glycol 3350 oral powder for reconstitution: 17 gram(s) orally once a day (at bedtime)  Rolling Walker with 5 inch wheels: Dx: s/p Left Anterior THR  senna oral tablet: 2 tab(s) orally once a day (at bedtime)   3:1 Commode: Dx: s/p Left Anterior THR  acetaminophen 500 mg oral tablet: 2 tab(s) orally every 8 hours  aspirin 81 mg oral delayed release tablet: 1 tab orally every 12 hours. Take 2 hours before Celebrex.   celecoxib 200 mg oral capsule: 1 cap orally every 12 hours. Take 2 hours after Aspirin.  Cymbalta 20 mg oral delayed release capsule: 2 tab(s) orally once a day  gabapentin 300 mg oral capsule: 1 cap(s) orally 3 times a day  levothyroxine 175 mcg (0.175 mg) oral tablet: 1 tab(s) orally once a day  meloxicam 10 mg oral capsule: 1 cap(s) orally once a day, As Needed  multivitamin: orally once a day  mupirocin 2% topical ointment: 1 application in each nostril 2 times a day   omeprazole 20 mg oral delayed release capsule: 1 cap orally once a day   oxyCODONE 5 mg oral tablet: 1 tab(s) orally every 4 hours, As Needed -for moderate pain MDD:6  polyethylene glycol 3350 oral powder for reconstitution: 17 gram(s) orally once a day (at bedtime)  Rolling Walker with 5 inch wheels: Dx: s/p Left Anterior THR  senna oral tablet: 2 tab(s) orally once a day (at bedtime)   3:1 Commode: Dx: s/p Left Anterior THR  acetaminophen 500 mg oral tablet: 2 tab(s) orally every 8 hours  aspirin 81 mg oral delayed release tablet: 1 tab orally every 12 hours. Take 2 hours before Celebrex.   celecoxib 200 mg oral capsule: 1 cap orally every 12 hours. Take 2 hours after Aspirin.  Cymbalta 20 mg oral delayed release capsule: 2 tab(s) orally once a day  gabapentin 300 mg oral capsule: 1 cap(s) orally 3 times a day  levothyroxine 175 mcg (0.175 mg) oral tablet: 1 tab(s) orally once a day  multivitamin: orally once a day  omeprazole 20 mg oral delayed release capsule: 1 cap orally once a day   oxyCODONE 5 mg oral tablet: 1 tab(s) orally every 4 hours, As Needed -for moderate pain MDD:6  polyethylene glycol 3350 oral powder for reconstitution: 17 gram(s) orally once a day (at bedtime)  Rolling Walker with 5 inch wheels: Dx: s/p Left Anterior THR  senna oral tablet: 2 tab(s) orally once a day (at bedtime)

## 2021-12-08 NOTE — DISCHARGE NOTE PROVIDER - HOSPITAL COURSE
This patient was admitted to [facility] with a history of severe degenerative joint disease of the left hip.  Patient underwent Pre-Surgical Testing and was medically cleared to undergo elective procedure. Patient underwent Left Anterior THR by Dr. Shayla Aviles on 12/8/21. Procedure was well tolerated.  No operative or rodolfo-operative complications arose during patient's hospital course.  Patient received antibiotic according to SCIP guidelines for infection prevention.  Aspirin 81 mg q 12h was given for DVT prophylaxis, in addition to the use of SCDs.  Anesthesia, Medical Hospitalist, Physical Therapy and Occupational Therapy were consulted. Patient is stable for discharge with a good prognosis.  Appropriate discharge instructions and medications are provided in this document.   This patient was admitted to [facility] with a history of severe degenerative joint disease of the left hip.  Patient underwent Pre-Surgical Testing and was medically cleared to undergo elective procedure. Patient underwent Left Anterior THR by Dr. Shayla Aviles on 12/8/21. Procedure was well tolerated.  No operative or rodolfo-operative complications arose during patient's hospital course.  Patient received antibiotic according to SCIP guidelines for infection prevention.  Aspirin 81 mg q 12h was given for DVT prophylaxis, in addition to the use of SCDs.  Anesthesia, Medical Hospitalist, Physical Therapy and Occupational Therapy were consulted. Patient is stable for discharge with a good prognosis.  Appropriate discharge instructions and medications are provided in this document.   [id number]

## 2021-12-08 NOTE — OCCUPATIONAL THERAPY INITIAL EVALUATION ADULT - LIVES WITH, PROFILE
Pt lives with his wife in a private home, however is going to stay at his brother's house upon discharge (pt has 3 flights of 15 steps each to enter his own home). Pt's brother's house has 2-3 steps to enter with a walk in shower and built in seat./spouse

## 2021-12-08 NOTE — DISCHARGE NOTE NURSING/CASE MANAGEMENT/SOCIAL WORK - NSDCPEFALRISK_GEN_ALL_CORE
For information on Fall & Injury Prevention, visit: [email or web address] OR  [email or web address] OR  https://www.cdc.gov/steadi/patient.html

## 2021-12-08 NOTE — PHYSICAL THERAPY INITIAL EVALUATION ADULT - PRECAUTIONS/LIMITATIONS, REHAB EVAL
Anterior hip precautions:No hyperextension,no extreme external rotation, no extreme abduction./left hip precautions

## 2021-12-08 NOTE — CONSULT NOTE ADULT - PROBLEM SELECTOR RECOMMENDATION 9
Pain Management: acceptable- continue current care Tylenol ATC/Celebrex ATC/ Oxycodone PRN  Continue PT/OT  DVT proph: [x  ] low risk - Aspirin    DC plan:  [ x ] Home with HC     Headache likely due to dehydration.  Ordered saline bolus, and increased continuous LR

## 2021-12-08 NOTE — OCCUPATIONAL THERAPY INITIAL EVALUATION ADULT - ANTICIPATED DISCHARGE DISPOSITION, OT EVAL
Recommend supervision/assist for functional activities prn which pt states wife will provide./no needs

## 2021-12-08 NOTE — PHYSICAL THERAPY INITIAL EVALUATION ADULT - MD ORDER
OOB PT evaluation and treat. Anterior hip precautions:No hyperextension,no extreme external rotation, no extreme abduction.

## 2021-12-08 NOTE — DISCHARGE NOTE PROVIDER - INSTRUCTIONS
Pain medicine has been prescribed for you, as needed, and it often causes constipation.  Take docusate sodium (Colace) 100mg 3x daily, while taking narcotic pain medication.   For Constipation :   • Increase your water intake. Drink at least 8 glasses of water daily.  • Try adding fiber to your diet by eating fruits, vegetables and foods that are rich in grains.  • If you do experience constipation, you may take an over-the-counter laxative such as, Senokot, Miralax or  Milk of Magnesia.

## 2021-12-08 NOTE — DISCHARGE NOTE NURSING/CASE MANAGEMENT/SOCIAL WORK - PATIENT PORTAL LINK FT
You can access the FollowMyHealth Patient Portal offered by [facility] by registering at the following website: [email or web address]. By joining [facility]’s FollowMyHealth portal, you will also be able to view your health information using other applications (apps) compatible with our system.

## 2021-12-08 NOTE — PHYSICAL THERAPY INITIAL EVALUATION ADULT - GAIT TRAINING, PT EVAL
Pt will ambulate 150 feet with RW independently in 3-4 sessions . Pt will negotiate 10 steps with handrail and cane independently in 3-4 sessions

## 2021-12-08 NOTE — DISCHARGE NOTE PROVIDER - NSDCFUSCHEDAPPT_GEN_ALL_CORE_FT
GUILLE SILVA ; 12/23/2021 ; [facility]  GUILLE SILVA ; 02/07/2022 ; [facility] [address]  GUILLE SILVA ; 02/15/2022 ; [facility] [address] [place] [address]

## 2021-12-08 NOTE — DISCHARGE NOTE PROVIDER - NSDCCPCAREPLAN_GEN_ALL_CORE_FT
PRINCIPAL DISCHARGE DIAGNOSIS  Diagnosis: Primary osteoarthritis of left hip  Assessment and Plan of Treatment: You have had an Anterior Total Hip Replacement. Follow instructions given to you by your surgeon.   PT/OT Total Hip Protocol; Ambulation, transfers, stairs, & ADLs  Full weight bearing both legs; Walker/cane use as instructed by PT/OT  Anterior THR precautions for 4 weeks: No straight leg raise; No external rotation of hip when extended-standing or lying flat; No hyperextension of hip when standing (kickback)  • Ice 20 minutes several times daily with at least a 20 minute break in between icing sessions  Daily dressing changes if incision is not completely dry.  If inicision is dry, it may be left open to air.  Keep incision clean. DO NOT APPLY ANYTHING to incision site (salves/ointments/creams).  May shower post-op if no drainage from incision.  Do not scrub incision site. Pat dry after shower.  Prineo tape removal on/near after Post Op Day # 14.  See PCP in office approximately 2-3 weeks from discharge for exam/labwork.

## 2021-12-08 NOTE — DISCHARGE NOTE NURSING/CASE MANAGEMENT/SOCIAL WORK - NSDPDISTO_GEN_ALL_CORE
[FreeTextEntry1] : Fredy Is a 11-year-old boy with a history of autism comes in today for follow up of flat feet. Patient has been walking on the outside aspect of both feet left greater than right with no history of injury. This has been progressing over the last year. Patient was seen 2 days ago via Telehealth medicine and he was recommended to follow up in the office today for XRs of bilateral feet and eventually get him measured for custom SMO braces. \par \par \par Of note, they were able to see their neurologist who had the child evaluated with an MRI of the brain and spinal cord as well as genetics testing for Charcot-Lory-Tooth. The genetics testing thus far has proven to be negative. Also the MRI of the brain and the spinal cord was also found to be negative. They are here for follow-up today to determine what the next step will be for his feet. \par  \par 
Home with home care

## 2021-12-09 VITALS
TEMPERATURE: 97 F | RESPIRATION RATE: 18 BRPM | HEART RATE: 55 BPM | DIASTOLIC BLOOD PRESSURE: 68 MMHG | SYSTOLIC BLOOD PRESSURE: 118 MMHG | OXYGEN SATURATION: 98 %

## 2021-12-09 LAB
ANION GAP SERPL CALC-SCNC: 8 MMOL/L — SIGNIFICANT CHANGE UP (ref 5–17)
BUN SERPL-MCNC: 16 MG/DL — SIGNIFICANT CHANGE UP (ref 7–23)
CALCIUM SERPL-MCNC: 9 MG/DL — SIGNIFICANT CHANGE UP (ref 8.4–10.5)
CHLORIDE SERPL-SCNC: 103 MMOL/L — SIGNIFICANT CHANGE UP (ref 96–108)
CO2 SERPL-SCNC: 26 MMOL/L — SIGNIFICANT CHANGE UP (ref 22–31)
CREAT SERPL-MCNC: 0.93 MG/DL — SIGNIFICANT CHANGE UP (ref 0.5–1.3)
GLUCOSE SERPL-MCNC: 106 MG/DL — HIGH (ref 70–99)
HCT VFR BLD CALC: 39.7 % — SIGNIFICANT CHANGE UP (ref 39–50)
HGB BLD-MCNC: 13.3 G/DL — SIGNIFICANT CHANGE UP (ref 13–17)
MCHC RBC-ENTMCNC: 28.6 PG — SIGNIFICANT CHANGE UP (ref 27–34)
MCHC RBC-ENTMCNC: 33.5 GM/DL — SIGNIFICANT CHANGE UP (ref 32–36)
MCV RBC AUTO: 85.4 FL — SIGNIFICANT CHANGE UP (ref 80–100)
NRBC # BLD: 0 /100 WBCS — SIGNIFICANT CHANGE UP (ref 0–0)
PLATELET # BLD AUTO: 210 K/UL — SIGNIFICANT CHANGE UP (ref 150–400)
POTASSIUM SERPL-MCNC: 4.2 MMOL/L — SIGNIFICANT CHANGE UP (ref 3.5–5.3)
POTASSIUM SERPL-SCNC: 4.2 MMOL/L — SIGNIFICANT CHANGE UP (ref 3.5–5.3)
RBC # BLD: 4.65 M/UL — SIGNIFICANT CHANGE UP (ref 4.2–5.8)
RBC # FLD: 12.4 % — SIGNIFICANT CHANGE UP (ref 10.3–14.5)
SODIUM SERPL-SCNC: 137 MMOL/L — SIGNIFICANT CHANGE UP (ref 135–145)
WBC # BLD: 13.24 K/UL — HIGH (ref 3.8–10.5)
WBC # FLD AUTO: 13.24 K/UL — HIGH (ref 3.8–10.5)

## 2021-12-09 PROCEDURE — 94664 DEMO&/EVAL PT USE INHALER: CPT

## 2021-12-09 PROCEDURE — 88311 DECALCIFY TISSUE: CPT

## 2021-12-09 PROCEDURE — 27130 TOTAL HIP ARTHROPLASTY: CPT | Mod: LT

## 2021-12-09 PROCEDURE — 99213 OFFICE O/P EST LOW 20 MIN: CPT

## 2021-12-09 PROCEDURE — 85018 HEMOGLOBIN: CPT

## 2021-12-09 PROCEDURE — 85027 COMPLETE CBC AUTOMATED: CPT

## 2021-12-09 PROCEDURE — 97165 OT EVAL LOW COMPLEX 30 MIN: CPT

## 2021-12-09 PROCEDURE — 80048 BASIC METABOLIC PNL TOTAL CA: CPT

## 2021-12-09 PROCEDURE — 97161 PT EVAL LOW COMPLEX 20 MIN: CPT

## 2021-12-09 PROCEDURE — C1776: CPT

## 2021-12-09 PROCEDURE — 76000 FLUOROSCOPY <1 HR PHYS/QHP: CPT

## 2021-12-09 PROCEDURE — 88305 TISSUE EXAM BY PATHOLOGIST: CPT

## 2021-12-09 PROCEDURE — 97535 SELF CARE MNGMENT TRAINING: CPT

## 2021-12-09 PROCEDURE — C1713: CPT

## 2021-12-09 PROCEDURE — 85014 HEMATOCRIT: CPT

## 2021-12-09 PROCEDURE — 97530 THERAPEUTIC ACTIVITIES: CPT

## 2021-12-09 PROCEDURE — C1889: CPT

## 2021-12-09 PROCEDURE — 36415 COLL VENOUS BLD VENIPUNCTURE: CPT

## 2021-12-09 PROCEDURE — 97110 THERAPEUTIC EXERCISES: CPT

## 2021-12-09 PROCEDURE — 97116 GAIT TRAINING THERAPY: CPT

## 2021-12-09 RX ORDER — OXYCODONE HYDROCHLORIDE 5 MG/1
1 TABLET ORAL
Qty: 42 | Refills: 0
Start: 2021-12-09

## 2021-12-09 RX ORDER — MELOXICAM 15 MG/1
1 TABLET ORAL
Qty: 0 | Refills: 0 | DISCHARGE

## 2021-12-09 RX ADMIN — PANTOPRAZOLE SODIUM 40 MILLIGRAM(S): 20 TABLET, DELAYED RELEASE ORAL at 06:01

## 2021-12-09 RX ADMIN — Medication 175 MICROGRAM(S): at 06:00

## 2021-12-09 RX ADMIN — Medication 102 MILLIGRAM(S): at 06:00

## 2021-12-09 RX ADMIN — OXYCODONE HYDROCHLORIDE 10 MILLIGRAM(S): 5 TABLET ORAL at 09:47

## 2021-12-09 RX ADMIN — DULOXETINE HYDROCHLORIDE 40 MILLIGRAM(S): 30 CAPSULE, DELAYED RELEASE ORAL at 06:02

## 2021-12-09 RX ADMIN — OXYCODONE HYDROCHLORIDE 10 MILLIGRAM(S): 5 TABLET ORAL at 09:17

## 2021-12-09 RX ADMIN — OXYCODONE HYDROCHLORIDE 10 MILLIGRAM(S): 5 TABLET ORAL at 00:30

## 2021-12-09 RX ADMIN — Medication 81 MILLIGRAM(S): at 06:03

## 2021-12-09 RX ADMIN — CELECOXIB 200 MILLIGRAM(S): 200 CAPSULE ORAL at 09:47

## 2021-12-09 RX ADMIN — CELECOXIB 200 MILLIGRAM(S): 200 CAPSULE ORAL at 09:17

## 2021-12-09 RX ADMIN — OXYCODONE HYDROCHLORIDE 10 MILLIGRAM(S): 5 TABLET ORAL at 12:45

## 2021-12-09 RX ADMIN — OXYCODONE HYDROCHLORIDE 10 MILLIGRAM(S): 5 TABLET ORAL at 13:45

## 2021-12-09 NOTE — PHARMACOTHERAPY INTERVENTION NOTE - COMMENTS
Admission medication reconciliation POD1
Transition of Care video discharge education - medication calendar given to patient Pharmacy fill confirmed.

## 2021-12-09 NOTE — PROGRESS NOTE ADULT - SUBJECTIVE AND OBJECTIVE BOX
Discharge medication calendar:  Aspirin EC 81mg q12h x 6 weeks  APAP 1000mg q8h x 2-3 weeks  Celecoxib 200mg q12h x 21 days  Omeprazole 20mg QAM x 6 weeks  Narcotic PRN  Docusate 100mg TID while taking narcotic  Miralax, Senna, or Bisacodyl PRN for treatment of constipation  
Orthopaedic Post Op Note    Procedure: Left Anterior THR  Surgeon: Shayla Aviles    61y Male comfortable, without complaints. Was up with PT. Tolerating diet.  Reported pain score = 0  Denies N/V, CP, SOB, numbness/tingling of extremities.    PE:  Vital Signs Last 24 Hrs  T(C): 36.6 (08 Dec 2021 12:12), Max: 36.8 (08 Dec 2021 11:45)  T(F): 97.8 (08 Dec 2021 12:12), Max: 98.2 (08 Dec 2021 11:45)  HR: 60 (08 Dec 2021 12:12) (60 - 72)  BP: 110/64 (08 Dec 2021 12:12) (100/53 - 145/80)  RR: 16 (08 Dec 2021 12:12) (11 - 20)  SpO2: 100% (08 Dec 2021 12:12) (98% - 100%)  General: Pt alert and oriented   Lungs: + BS CTA bilaterally  Heart: +S1 & S2 heard, RRR  Abd: + BS heard, soft, NT, ND  Left Hip Silverlon Dressing: C/D/I   Bilateral LEs:  Motor:   5/5 dorsiflexion, plantarflexion, EHL  Sensation intact to LT  2+ DP Pulses  SCDs in place    A/P: 61y Male stable POD#0 s/p Left Anterior THR   -  Acetaminophen, Celebrex, Dilaudid, Oxycodone for Pain Control   - DVT ppx: Aspirin 81mg q 12h  - Kika op IV abx: Ancef  - Celebrex for HO ppx  - Anterior total hip precautions  - PT, OT per protocol  - F/U AM Labs  DCP = home tomorrow pending PT, OT, medical clearance        
POST OPERATIVE DAY #:  [ x]   STATUS POST: [ ] Left [ ] Right  [ ]TKR [ x]THR                        Patient is a 61y old  Male who presents with a chief complaint of Left anterior THR for severe left hip OA.   (08 Dec 2021 13:08)      Pain well controlled    OBJECTIVE:     Vital Signs Last 24 Hrs  T(C): 36.4 (09 Dec 2021 03:30), Max: 36.9 (08 Dec 2021 15:54)  T(F): 97.5 (09 Dec 2021 03:30), Max: 98.5 (08 Dec 2021 15:54)  HR: 64 (09 Dec 2021 03:30) (60 - 76)  BP: 144/79 (09 Dec 2021 03:30) (100/53 - 144/79)  BP(mean): --  RR: 18 (09 Dec 2021 03:30) (11 - 20)  SpO2: 97% (09 Dec 2021 03:30) (95% - 100%)    Affected extremity:        silverlon  Dressing:  clean/dry/intact             Sensation; intact to light touch          Motor exam: Toes warm and mobile         No calf tenderness bilateral LE's    LABS:                        13.3   13.24 )-----------( 210      ( 09 Dec 2021 06:29 )             39.7     12-09    137  |  103  |  16  ----------------------------<  106<H>  4.2   |  26  |  0.93    Ca    9.0      09 Dec 2021 06:29              A/P :    -    Analgesics PRN  -    DVT ppx: [x ]ASA 81 bid [ ] Lovenox [ ] Coumadin   [ ] Eliquis   -    Weight bearing status: WBAT [ x]        PWB    [ ]     TTWB  [ ]      NWB  [ ]  -    Physical Therapy  -    Occupational Therapy  -    Dispo: Home [x ]     Rehab [ ]      LISA [ ]      To be determined [ ]  
Patient is a 61y old  Male who presents with a chief complaint of Left anterior THR for severe left hip OA.   (08 Dec 2021 13:08)    INTERVAL HPI/OVERNIGHT EVENTS:  feeling well, no complaints.  pain controlled, wants to go home. Headache resolved.    MEDICATIONS  (STANDING):  aspirin enteric coated 81 milliGRAM(s) Oral every 12 hours  celecoxib 200 milliGRAM(s) Oral every 12 hours  DULoxetine 40 milliGRAM(s) Oral daily  gabapentin 300 milliGRAM(s) Oral three times a day  lactated ringers. 1000 milliLiter(s) (125 mL/Hr) IV Continuous <Continuous>  levothyroxine 175 MICROGram(s) Oral daily  pantoprazole    Tablet 40 milliGRAM(s) Oral before breakfast  polyethylene glycol 3350 17 Gram(s) Oral at bedtime  senna 2 Tablet(s) Oral at bedtime    MEDICATIONS  (PRN):  aluminum hydroxide/magnesium hydroxide/simethicone Suspension 30 milliLiter(s) Oral four times a day PRN Indigestion  HYDROmorphone  Injectable 0.5 milliGRAM(s) IV Push every 3 hours PRN breakthrough pain  melatonin 10 milliGRAM(s) Oral at bedtime PRN Insomnia  ondansetron Injectable 4 milliGRAM(s) IV Push every 6 hours PRN Nausea and/or Vomiting  oxyCODONE    IR 5 milliGRAM(s) Oral every 3 hours PRN Moderate Pain (4 - 6)  oxyCODONE    IR 10 milliGRAM(s) Oral every 3 hours PRN Severe Pain (7 - 10)      Allergies  No Known Allergies    REVIEW OF SYSTEMS:  CONSTITUTIONAL: No fever, weight loss, or fatigue  EYES: No eye pain, visual disturbances, or discharge  ENMT:  No difficulty hearing, tinnitus, vertigo; No sinus or throat pain  NECK: No pain or stiffness  BREASTS: No pain, masses, or nipple discharge  RESPIRATORY: No cough, wheezing, chills or hemoptysis; No shortness of breath  CARDIOVASCULAR: No chest pain, palpitations, or lightheadedness  GASTROINTESTINAL: No abdominal or epigastric pain. No nausea, vomiting, or hematemesis; No diarrhea or constipation. No melena or hematochezia.  GENITOURINARY: No dysuria, frequency, hematuria, or incontinence  NEUROLOGICAL: No headaches, vertigo, memory loss, loss of strength, numbness, or tremors  SKIN: No itching, burning, rashes, or lesions   LYMPH NODES: No enlarged glands  ENDOCRINE: No heat or cold intolerance; No hair loss; No polydipsia or polyuria  MUSCULOSKELETAL: No back pain  PSYCHIATRIC: No depression, anxiety, or mood swings  HEME/LYMPH: No easy bruising, or bleeding gums  ALLERGY AND IMMUNOLOGIC: No hives or eczema    Vital Signs Last 24 Hrs  T(C): 36.3 (09 Dec 2021 08:11), Max: 36.9 (08 Dec 2021 15:54)  T(F): 97.4 (09 Dec 2021 08:11), Max: 98.5 (08 Dec 2021 15:54)  HR: 55 (09 Dec 2021 08:11) (55 - 76)  BP: 118/68 (09 Dec 2021 08:11) (115/65 - 144/79)  BP(mean): --  RR: 18 (09 Dec 2021 08:11) (18 - 18)  SpO2: 98% (09 Dec 2021 08:11) (95% - 98%)    PHYSICAL EXAM:  GENERAL: NAD, well-groomed, well-developed  HEAD:  Atraumatic, Normocephalic  EYES:  conjunctiva and sclera clear  ENMT: Moist mucous membranes  NECK: Supple, No JVD  NERVOUS SYSTEM:  Alert & Oriented X3, Good concentration; Bilateral LE mobile, sensation to light touch intact  CHEST/LUNG: Clear to auscultation bilaterally; No rales, rhonchi, wheezing, or rubs  HEART: Regular rate and rhythm; No murmurs, rubs, or gallops  ABDOMEN: Soft, Nontender, Nondistended; Bowel sounds present  EXTREMITIES:  2+ Peripheral Pulses, No clubbing or cyanosis  LYMPH: No lymphadenopathy noted  SKIN: No rashes or lesions  INCISION:  Dressing dry and intact    LABS:                        13.3   13.24 )-----------( 210      ( 09 Dec 2021 06:29 )             39.7     09 Dec 2021 06:29    137    |  103    |  16     ----------------------------<  106    4.2     |  26     |  0.93     Ca    9.0        09 Dec 2021 06:29          CAPILLARY BLOOD GLUCOSE          RADIOLOGY & ADDITIONAL TESTS:    Imaging Personally Reviewed:      [ ] Consultant(s) Notes Reviewed  [x] Care Discussed with Consultants/Other Providers:  Ortho PA- plan of care

## 2021-12-16 ENCOUNTER — RX RENEWAL (OUTPATIENT)
Age: 61
End: 2021-12-16

## 2021-12-27 ENCOUNTER — APPOINTMENT (OUTPATIENT)
Dept: ORTHOPEDIC SURGERY | Facility: CLINIC | Age: 61
End: 2021-12-27
Payer: COMMERCIAL

## 2021-12-27 PROCEDURE — 73502 X-RAY EXAM HIP UNI 2-3 VIEWS: CPT | Mod: LT

## 2021-12-27 PROCEDURE — 99024 POSTOP FOLLOW-UP VISIT: CPT

## 2022-01-07 ENCOUNTER — NON-APPOINTMENT (OUTPATIENT)
Age: 62
End: 2022-01-07

## 2022-01-13 LAB — TESTOST SERPL-MCNC: 307 NG/DL

## 2022-01-15 ENCOUNTER — RX RENEWAL (OUTPATIENT)
Age: 62
End: 2022-01-15

## 2022-01-17 ENCOUNTER — RX RENEWAL (OUTPATIENT)
Age: 62
End: 2022-01-17

## 2022-01-21 NOTE — ASU PATIENT PROFILE, ADULT - FALL HARM RISK - TYPE OF ASSESSMENT
normal appearance, without tenderness upon palpation, no deformities, no cervical lymphadenopathy, no masses, no thyroid nodules, Thyroid normal size, no JVD, thyroid nontender Admission

## 2022-02-01 ENCOUNTER — APPOINTMENT (OUTPATIENT)
Dept: ORTHOPEDIC SURGERY | Facility: CLINIC | Age: 62
End: 2022-02-01
Payer: COMMERCIAL

## 2022-02-01 VITALS — DIASTOLIC BLOOD PRESSURE: 76 MMHG | SYSTOLIC BLOOD PRESSURE: 117 MMHG | HEART RATE: 61 BPM

## 2022-02-01 DIAGNOSIS — Z96.642 PRESENCE OF LEFT ARTIFICIAL HIP JOINT: ICD-10-CM

## 2022-02-01 PROCEDURE — 99024 POSTOP FOLLOW-UP VISIT: CPT

## 2022-02-01 PROCEDURE — 73502 X-RAY EXAM HIP UNI 2-3 VIEWS: CPT | Mod: LT

## 2022-02-07 ENCOUNTER — APPOINTMENT (OUTPATIENT)
Dept: NEUROSURGERY | Facility: CLINIC | Age: 62
End: 2022-02-07
Payer: COMMERCIAL

## 2022-02-07 VITALS
WEIGHT: 230 LBS | BODY MASS INDEX: 32.2 KG/M2 | SYSTOLIC BLOOD PRESSURE: 155 MMHG | OXYGEN SATURATION: 98 % | HEIGHT: 71 IN | DIASTOLIC BLOOD PRESSURE: 69 MMHG | HEART RATE: 58 BPM | TEMPERATURE: 96.4 F

## 2022-02-07 PROCEDURE — 99213 OFFICE O/P EST LOW 20 MIN: CPT

## 2022-02-09 NOTE — CONSULT LETTER
[Dear  ___] : Dear  [unfilled], [Courtesy Letter:] : I had the pleasure of seeing your patient, [unfilled], in my office today. [Sincerely,] : Sincerely, [FreeTextEntry2] : Sushil Monet MD\par [address]\par [place] [id number] \par  [FreeTextEntry1] : This very pleasant now 61-year-old gentleman is well-known to me. He works as a  and has a significant history of extensive ankylosing spondylitis or DISH. I previously operated in December 2018 when the patient was found to have extensive anterior osteophytes at C2-3 and C3-4 which were causing deflection of the esophagus and affecting his ability to swallow. The patient returns now with similar neck pain and throat pain and increasing difficulty with swallowing  solid foods. He feels as if he is going to choke. He is constantly clearing his throat because he feels material is stuck within the esophagus part way down. He has had events where material has become stuck and he has had to drink to try and dislodge it. He has not had full obstruction. The patient also is experiencing increasing problems with tightness within the neck. He has severe restriction in neck movement. He describes muscle weakness posteriorly and describes having difficulty to keep his head up. He is experiencing bilateral numbness and tingling in the hands and it is prominent extending up into the left arm. The patient has undergone a updated MRI scan of the cervical spine.\par \par I have reviewed with the patient directly as well as his wife who is present in support the updated MRI scan. This identifies multiple levels of extensive degenerative osteophytes especially at C4-5 and C5-6. The anterior osteophytes at C5-6 in particular appeared to be significantly larger compared to previous studies. In addition the compromise of the lateral foramen is more prominent at C4-5, C5-6, and C6-7 which may correlate with the patient's numbness and tingling involving the extremities. There is no evidence of myelomalacia.\par \par On examination the patient has extreme restriction in neck range of motion. The patient has good shoulder range of motion. The patient's voice is hoarse and raspy. The patient has very good strength in the upper extremities and no evidence of muscle wasting or fasciculations. He has good hand dexterity and hand . The biceps, triceps, and brachioradialis reflexes are normal and symmetric. Flakita sign is negative. Phalen's and Tinel's tests are negative. The patient maps numbness and tingling most prominently into the first 3 fingers on both hands but also includes the fourth and the fifth on the left and extends up into the forearm.\par \par I am extremely concerned with the patient's description of difficulty with swallowing and having food occasionally gets stuck. Previously the patient underwent a barium swallow esophagram which identified deflection of the food bolus. The MRI scan should suggest that there is growth of the osteophyte complexes anteriorly at C5-6 in particular which may be the underlying cause of this problem. I have therefore asked the patient to undergo a further swallow evaluation with video assessment. In addition I have ordered a CT scan of the cervical spine to better evaluate the extent of the ankylosing spondylosis or DISH. This will allow us to understand if there is any meaningful role for surgical intervention in combination with the information already available in the MRI scan. It is possible that a further EMG nerve conduction study may be required for decision-making as the patient did have prior evidence of bilateral carpal tunnel syndrome back in 2018. I have finally, asked for the patient to be evaluated by rheumatology for consideration of treatment options for his DISH to try and mitigate further functional difficulties in this young 61-year-old gentleman.\par \par Thank you for very kindly including me in the ongoing evaluation and support of your patient. Please do not hesitate to contact me should you have any questions or concerns regarding this evaluation or the patient's ongoing follow-up care investigations. [FreeTextEntry3] : Huber Clifford MD, PhD, FRCPSC \par Attending Neurosurgeon \par  of Neurosurgery \par [facility] \par [address], [address]nd floor \[place] [address] \par Office: [phone] \par Fax: [phone]\par \par

## 2022-02-28 ENCOUNTER — RX RENEWAL (OUTPATIENT)
Age: 62
End: 2022-02-28

## 2022-03-02 LAB — TESTOST SERPL-MCNC: 258 NG/DL

## 2022-03-08 ENCOUNTER — APPOINTMENT (OUTPATIENT)
Dept: ORTHOPEDIC SURGERY | Facility: CLINIC | Age: 62
End: 2022-03-08
Payer: COMMERCIAL

## 2022-03-08 VITALS
DIASTOLIC BLOOD PRESSURE: 89 MMHG | BODY MASS INDEX: 32.2 KG/M2 | SYSTOLIC BLOOD PRESSURE: 136 MMHG | HEIGHT: 71 IN | WEIGHT: 230 LBS | HEART RATE: 68 BPM

## 2022-03-08 PROCEDURE — 73562 X-RAY EXAM OF KNEE 3: CPT | Mod: RT

## 2022-03-08 PROCEDURE — 20610 DRAIN/INJ JOINT/BURSA W/O US: CPT | Mod: 79,RT

## 2022-03-08 PROCEDURE — 99024 POSTOP FOLLOW-UP VISIT: CPT

## 2022-03-08 RX ADMIN — TRIAMCINOLONE ACETONIDE 2 MG/ML: 40 INJECTION, SUSPENSION INTRA-ARTICULAR; INTRAMUSCULAR at 00:00

## 2022-03-11 RX ORDER — TRIAMCINOLONE ACETONIDE 40 MG/ML
40 SUSPENSION INTRA-ARTERIAL; INTRAMUSCULAR
Qty: 1 | Refills: 0 | Status: COMPLETED | OUTPATIENT
Start: 2022-03-08

## 2022-03-28 ENCOUNTER — APPOINTMENT (OUTPATIENT)
Dept: NEUROSURGERY | Facility: CLINIC | Age: 62
End: 2022-03-28

## 2022-04-01 ENCOUNTER — NON-APPOINTMENT (OUTPATIENT)
Age: 62
End: 2022-04-01

## 2022-04-04 ENCOUNTER — APPOINTMENT (OUTPATIENT)
Dept: UROLOGY | Facility: CLINIC | Age: 62
End: 2022-04-04
Payer: COMMERCIAL

## 2022-04-04 VITALS
SYSTOLIC BLOOD PRESSURE: 135 MMHG | BODY MASS INDEX: 32.08 KG/M2 | HEART RATE: 67 BPM | TEMPERATURE: 98 F | OXYGEN SATURATION: 97 % | WEIGHT: 230 LBS | DIASTOLIC BLOOD PRESSURE: 82 MMHG

## 2022-04-04 PROCEDURE — 96372 THER/PROPH/DIAG INJ SC/IM: CPT

## 2022-04-04 PROCEDURE — 99214 OFFICE O/P EST MOD 30 MIN: CPT | Mod: 25

## 2022-04-04 RX ORDER — MELOXICAM 15 MG/1
15 TABLET ORAL DAILY
Qty: 30 | Refills: 0 | Status: DISCONTINUED | COMMUNITY
Start: 2021-10-01 | End: 2022-04-04

## 2022-04-04 RX ORDER — AMOXICILLIN 500 MG/1
500 TABLET, FILM COATED ORAL
Qty: 20 | Refills: 2 | Status: DISCONTINUED | COMMUNITY
Start: 2021-12-27 | End: 2022-04-04

## 2022-04-04 RX ORDER — MELOXICAM 15 MG/1
15 TABLET ORAL DAILY
Qty: 30 | Refills: 1 | Status: DISCONTINUED | COMMUNITY
Start: 2021-10-01 | End: 2022-04-04

## 2022-04-04 RX ORDER — GABAPENTIN 300 MG/1
300 CAPSULE ORAL
Qty: 60 | Refills: 0 | Status: DISCONTINUED | COMMUNITY
Start: 2021-11-17 | End: 2022-04-04

## 2022-04-04 RX ORDER — OXYCODONE 5 MG/1
5 TABLET ORAL
Qty: 30 | Refills: 0 | Status: DISCONTINUED | COMMUNITY
Start: 2021-12-27 | End: 2022-04-04

## 2022-04-04 RX ORDER — ATOMOXETINE 40 MG/1
40 CAPSULE ORAL
Qty: 30 | Refills: 0 | Status: ACTIVE | COMMUNITY
Start: 2022-01-18

## 2022-04-04 RX ORDER — ACETAMINOPHEN 500 MG/1
500 TABLET ORAL
Refills: 0 | Status: DISCONTINUED | COMMUNITY
End: 2022-04-04

## 2022-04-04 RX ORDER — MELOXICAM 15 MG/1
15 TABLET ORAL DAILY
Qty: 21 | Refills: 1 | Status: DISCONTINUED | COMMUNITY
Start: 2021-09-30 | End: 2022-04-04

## 2022-04-04 RX ORDER — TRAMADOL HYDROCHLORIDE 50 MG/1
50 TABLET, COATED ORAL
Qty: 60 | Refills: 1 | Status: DISCONTINUED | COMMUNITY
Start: 2021-09-30 | End: 2022-04-04

## 2022-04-04 RX ORDER — TESTOSTERONE CYPIONATE 200 MG/ML
200 INJECTION, SOLUTION INTRAMUSCULAR
Qty: 1 | Refills: 0 | Status: COMPLETED | COMMUNITY
Start: 2022-04-04 | End: 2022-04-04

## 2022-04-04 RX ORDER — TESTOSTERONE CYPIONATE 200 MG/ML
200 INJECTION, SOLUTION INTRAMUSCULAR
Qty: 1 | Refills: 0 | Status: DISCONTINUED | COMMUNITY
Start: 2021-12-27 | End: 2022-04-04

## 2022-04-04 RX ORDER — TESTOSTERONE CYPIONATE 200 MG/ML
200 INJECTION, SOLUTION INTRAMUSCULAR
Refills: 0 | Status: COMPLETED | OUTPATIENT
Start: 2022-04-04

## 2022-04-04 RX ORDER — OMEPRAZOLE 20 MG/1
20 CAPSULE, DELAYED RELEASE ORAL
Qty: 30 | Refills: 0 | Status: ACTIVE | COMMUNITY
Start: 2022-01-06

## 2022-04-04 RX ADMIN — TESTOSTERONE CYPIONATE 0 MG/ML: 200 INJECTION, SOLUTION INTRAMUSCULAR at 00:00

## 2022-04-05 LAB
ALBUMIN SERPL ELPH-MCNC: 4.6 G/DL
ALP BLD-CCNC: 79 U/L
ALT SERPL-CCNC: 36 U/L
ANION GAP SERPL CALC-SCNC: 13 MMOL/L
AST SERPL-CCNC: 25 U/L
BASOPHILS # BLD AUTO: 0.04 K/UL
BASOPHILS NFR BLD AUTO: 0.7 %
BILIRUB SERPL-MCNC: 0.3 MG/DL
BUN SERPL-MCNC: 15 MG/DL
CALCIUM SERPL-MCNC: 9.5 MG/DL
CHLORIDE SERPL-SCNC: 107 MMOL/L
CHOLEST SERPL-MCNC: 221 MG/DL
CO2 SERPL-SCNC: 22 MMOL/L
CREAT SERPL-MCNC: 0.9 MG/DL
EGFR: 98 ML/MIN/1.73M2
EOSINOPHIL # BLD AUTO: 0.17 K/UL
EOSINOPHIL NFR BLD AUTO: 2.9 %
GLUCOSE SERPL-MCNC: 128 MG/DL
HCT VFR BLD CALC: 47 %
HDLC SERPL-MCNC: 54 MG/DL
HGB BLD-MCNC: 14.7 G/DL
IMM GRANULOCYTES NFR BLD AUTO: 0.3 %
LDLC SERPL CALC-MCNC: 92 MG/DL
LYMPHOCYTES # BLD AUTO: 1.25 K/UL
LYMPHOCYTES NFR BLD AUTO: 21.1 %
MAN DIFF?: NORMAL
MCHC RBC-ENTMCNC: 27.8 PG
MCHC RBC-ENTMCNC: 31.3 GM/DL
MCV RBC AUTO: 89 FL
MONOCYTES # BLD AUTO: 0.6 K/UL
MONOCYTES NFR BLD AUTO: 10.1 %
NEUTROPHILS # BLD AUTO: 3.84 K/UL
NEUTROPHILS NFR BLD AUTO: 64.9 %
NONHDLC SERPL-MCNC: 167 MG/DL
PLATELET # BLD AUTO: 236 K/UL
POTASSIUM SERPL-SCNC: 4 MMOL/L
PROT SERPL-MCNC: 6.8 G/DL
PSA SERPL-MCNC: 0.88 NG/ML
RBC # BLD: 5.28 M/UL
RBC # FLD: 13.8 %
SODIUM SERPL-SCNC: 142 MMOL/L
TESTOST SERPL-MCNC: 180 NG/DL
TRIGL SERPL-MCNC: 374 MG/DL
WBC # FLD AUTO: 5.92 K/UL

## 2022-04-05 NOTE — LETTER BODY
[Dear  ___] : Dear  [unfilled], [Consult Letter:] : I had the pleasure of evaluating your patient, [unfilled]. [Please see my note below.] : Please see my note below. [Consult Closing:] : Thank you very much for allowing me to participate in the care of this patient.  If you have any questions, please do not hesitate to contact me. [FreeTextEntry1] : Please see my note.  I will keep you informed. [FreeTextEntry3] : Sincerely,\par Ashanti Tellez MD\par Clinical \par [facility]\par [facility]\par

## 2022-04-05 NOTE — HISTORY OF PRESENT ILLNESS
[FreeTextEntry1] : GUILLE SILVA is a 61 year old M who presents with Low T, low energy, fatigue and low libido. Tells me he saw a psychologist who started him on a new med for ADD, but he has self D/C'ed it because it makes you more energized and focused, but he would rather feel that way from improvement of his testosterone. Explained that he should not stop taking his medication the correct way without speaking to whoever prescribed it.\par \par We reviewed fact that he was in low end of normal range and there is no real way of knowing whether it is due to his low T that he feels fatigued. The only way to really know that will be based on symptoms.\par \par Explained risks and benefits again: stimulates erythropoiesis, but should positively improve metabolic syndrome.\par Need to monitor PSA, testosterone levels

## 2022-04-05 NOTE — ASSESSMENT
[FreeTextEntry1] : Testosterone data reviewed and fact that in well designed trials there was not a higher incidence of clots or cardiac events when managed and followed correctly. \par \par He opted to proceed. Given first injection today and then wanted to talk about gels and creams and wanted to know how he will know it is working. I was clear in stating that there is no particular target number, but that we keep patients in range, and base it on how they feel: he may or may not notice a difference in his libido, energy level, focus etc.\par \par He will return in two weeks to receive his next injection.\par \par Aware that PSA may rise, and often does, but that it is something that is followed and sometimes warrants further work up. Also mentioned, that if H/H too high, cannot receive, etc.\par \par Today, a T, PSA, CBC, lipid panel were obtained etc.

## 2022-04-06 ENCOUNTER — APPOINTMENT (OUTPATIENT)
Dept: INTERNAL MEDICINE | Facility: CLINIC | Age: 62
End: 2022-04-06
Payer: COMMERCIAL

## 2022-04-06 DIAGNOSIS — J20.9 ACUTE BRONCHITIS, UNSPECIFIED: ICD-10-CM

## 2022-04-06 PROCEDURE — 99213 OFFICE O/P EST LOW 20 MIN: CPT | Mod: 95

## 2022-04-06 RX ORDER — PREDNISONE 10 MG/1
10 TABLET ORAL
Qty: 12 | Refills: 0 | Status: ACTIVE | COMMUNITY
Start: 2022-04-06 | End: 1900-01-01

## 2022-04-06 RX ORDER — PROMETHAZINE HYDROCHLORIDE 6.25 MG/5ML
6.25 SOLUTION ORAL
Qty: 90 | Refills: 1 | Status: ACTIVE | COMMUNITY
Start: 2022-04-06 | End: 1900-01-01

## 2022-04-06 RX ORDER — AZITHROMYCIN 250 MG/1
250 TABLET, FILM COATED ORAL
Qty: 1 | Refills: 0 | Status: ACTIVE | COMMUNITY
Start: 2022-04-06 | End: 1900-01-01

## 2022-04-06 NOTE — PHYSICAL EXAM
[No Acute Distress] : no acute distress [Normal Sclera/Conjunctiva] : normal sclera/conjunctiva [Normal Outer Ear/Nose] : the outer ears and nose were normal in appearance [No JVD] : no jugular venous distention [No Respiratory Distress] : no respiratory distress  [de-identified] : occ cough

## 2022-04-06 NOTE — REVIEW OF SYSTEMS
[Fever] : no fever [Chills] : no chills [Sore Throat] : sore throat [Chest Pain] : no chest pain [Palpitations] : no palpitations [Shortness Of Breath] : no shortness of breath [Wheezing] : no wheezing [Cough] : cough

## 2022-04-06 NOTE — HISTORY OF PRESENT ILLNESS
[Home] : at home, [unfilled] , at the time of the visit. [Medical Office: ([facility])___] : at the medical office located in  [Verbal consent obtained from patient] : the patient, [unfilled] [de-identified] : Pt asks for teb for cough and mucous and wheeze  tested neg for covid

## 2022-04-18 ENCOUNTER — NON-APPOINTMENT (OUTPATIENT)
Age: 62
End: 2022-04-18

## 2022-04-18 ENCOUNTER — APPOINTMENT (OUTPATIENT)
Dept: UROLOGY | Facility: CLINIC | Age: 62
End: 2022-04-18
Payer: COMMERCIAL

## 2022-04-18 VITALS
DIASTOLIC BLOOD PRESSURE: 77 MMHG | SYSTOLIC BLOOD PRESSURE: 172 MMHG | TEMPERATURE: 97.5 F | OXYGEN SATURATION: 96 % | HEART RATE: 60 BPM

## 2022-04-18 PROCEDURE — 96372 THER/PROPH/DIAG INJ SC/IM: CPT

## 2022-04-18 PROCEDURE — 99213 OFFICE O/P EST LOW 20 MIN: CPT | Mod: 25

## 2022-04-18 RX ORDER — TESTOSTERONE CYPIONATE 200 MG/ML
200 INJECTION, SOLUTION INTRAMUSCULAR
Qty: 1 | Refills: 0 | Status: COMPLETED | COMMUNITY
Start: 2022-04-18 | End: 2022-04-18

## 2022-04-18 RX ORDER — TESTOSTERONE CYPIONATE 200 MG/ML
200 INJECTION, SOLUTION INTRAMUSCULAR
Refills: 0 | Status: COMPLETED | OUTPATIENT
Start: 2022-04-18

## 2022-04-18 NOTE — HISTORY OF PRESENT ILLNESS
[FreeTextEntry1] : GUILLE SILVA is a 61 year old M who presents with fatigue, low T and low libido, who received first injection 2 wks ago. Does not want blood this time, but rather at third injection. Concerned about staying on this and worried that it will shut down his own production of testosterone.  Explained that it often does. He is unlikely to feel worse though, as he is low normal anyway, and that is what concerns him. Either way, it does fluctuate. He is inquiring whether he needs a higher dose or not and questions what is the point, if levels drop again?  I explained again that levels fluctuate throughout the day and also that the absorption rate and amount varies from person to person, and that by 2 wks, there is a trough and that is why this is taken regularly.

## 2022-04-18 NOTE — ASSESSMENT
[FreeTextEntry1] : He feels the same, and I again told him that I am not sure that this will help him and his complaints, but that it makes sense he might be lethargic, or lack libido with his very low normal levels of T. It is worthwhile to replace his T, but only time will tell If it "makes him feel better". \par \par I also told him if he is worried about it, as it still seems to me that he is not 100% "on board" with taking it, he should speak w an endocrinologist about long term. I reiterated that it is his decision ultimately and that we are not targeting a specific number, but seeing if being a little higher makes him feel more  energized, virile etc.

## 2022-04-18 NOTE — HISTORY OF PRESENT ILLNESS
[FreeTextEntry1] : GUILLE SILVA is a 61 year old M who presents with low T and is here for second injection.

## 2022-05-02 ENCOUNTER — APPOINTMENT (OUTPATIENT)
Dept: UROLOGY | Facility: CLINIC | Age: 62
End: 2022-05-02
Payer: COMMERCIAL

## 2022-05-02 VITALS
OXYGEN SATURATION: 97 % | BODY MASS INDEX: 32.08 KG/M2 | DIASTOLIC BLOOD PRESSURE: 73 MMHG | TEMPERATURE: 97.9 F | WEIGHT: 230 LBS | HEART RATE: 72 BPM | SYSTOLIC BLOOD PRESSURE: 149 MMHG

## 2022-05-02 PROCEDURE — 99212 OFFICE O/P EST SF 10 MIN: CPT | Mod: 25

## 2022-05-02 PROCEDURE — 96372U: CUSTOM | Mod: NC

## 2022-05-02 RX ORDER — TESTOSTERONE CYPIONATE 200 MG/ML
200 INJECTION, SOLUTION INTRAMUSCULAR
Refills: 0 | Status: COMPLETED | OUTPATIENT
Start: 2022-05-02

## 2022-05-02 RX ORDER — TESTOSTERONE CYPIONATE 200 MG/ML
200 INJECTION, SOLUTION INTRAMUSCULAR
Qty: 1 | Refills: 0 | Status: COMPLETED | COMMUNITY
Start: 2022-05-01 | End: 2022-05-02

## 2022-05-02 RX ADMIN — TESTOSTERONE CYPIONATE 0 MG/ML: 200 INJECTION, SOLUTION INTRAMUSCULAR at 00:00

## 2022-05-02 NOTE — ASSESSMENT
[FreeTextEntry1] : Today needs his total T, total PSA and CBC checked. Reminded him of need for periodic labs to assess response.\par He will return in 2 wks.\par \par If patient's do not respond and still out of range, they OCCASIONALLY need a higher dosage, but not typically.

## 2022-05-02 NOTE — HISTORY OF PRESENT ILLNESS
[FreeTextEntry1] : GUILLE SILVA is a 61 year old M who presents with low testosterone documented and for his third injection. He is inquiring about higher doses, as he doesn't feel any improvement in libido or energy. Still with some lethargy.\par \par We talked about standard dose of medication for at least the beginning to see response, as well as whether or not he responds in terms of numbers as well.

## 2022-05-05 LAB
BASOPHILS # BLD AUTO: 0.02 K/UL
BASOPHILS NFR BLD AUTO: 0.5 %
EOSINOPHIL # BLD AUTO: 0.22 K/UL
EOSINOPHIL NFR BLD AUTO: 5.1 %
HCT VFR BLD CALC: 47.2 %
HGB BLD-MCNC: 15 G/DL
IMM GRANULOCYTES NFR BLD AUTO: 0.2 %
LYMPHOCYTES # BLD AUTO: 1.11 K/UL
LYMPHOCYTES NFR BLD AUTO: 25.9 %
MAN DIFF?: NORMAL
MCHC RBC-ENTMCNC: 28.8 PG
MCHC RBC-ENTMCNC: 31.8 GM/DL
MCV RBC AUTO: 90.6 FL
MONOCYTES # BLD AUTO: 0.49 K/UL
MONOCYTES NFR BLD AUTO: 11.4 %
NEUTROPHILS # BLD AUTO: 2.44 K/UL
NEUTROPHILS NFR BLD AUTO: 56.9 %
PLATELET # BLD AUTO: 210 K/UL
PSA SERPL-MCNC: 0.78 NG/ML
RBC # BLD: 5.21 M/UL
RBC # FLD: 15.9 %
TESTOST SERPL-MCNC: 293 NG/DL
WBC # FLD AUTO: 4.29 K/UL

## 2022-05-06 ENCOUNTER — APPOINTMENT (OUTPATIENT)
Dept: RHEUMATOLOGY | Facility: CLINIC | Age: 62
End: 2022-05-06

## 2022-05-18 ENCOUNTER — APPOINTMENT (OUTPATIENT)
Dept: UROLOGY | Facility: CLINIC | Age: 62
End: 2022-05-18

## 2022-05-20 NOTE — ED ADULT NURSE NOTE - NSFALLRSKHRMRISKTYPE_ED_ALL_ED
----- Message from Justo Colin MD sent at 5/19/2022  6:06 PM CDT -----  Portal message sent.       
Writer left message for patient. He should have received a portal message from Dr. Colin regarding his recent imaging. It appears he may have a stone. Dr. Colin to address this at his upcoming appointment on 5/27.   
other

## 2022-05-23 ENCOUNTER — APPOINTMENT (OUTPATIENT)
Dept: UROLOGY | Facility: CLINIC | Age: 62
End: 2022-05-23
Payer: COMMERCIAL

## 2022-05-23 VITALS
HEART RATE: 60 BPM | OXYGEN SATURATION: 98 % | HEIGHT: 71 IN | BODY MASS INDEX: 32.2 KG/M2 | DIASTOLIC BLOOD PRESSURE: 79 MMHG | TEMPERATURE: 97.8 F | WEIGHT: 230 LBS | SYSTOLIC BLOOD PRESSURE: 146 MMHG

## 2022-05-23 PROCEDURE — 99212 OFFICE O/P EST SF 10 MIN: CPT

## 2022-05-23 RX ORDER — TESTOSTERONE CYPIONATE 200 MG/ML
200 INJECTION, SOLUTION INTRAMUSCULAR
Qty: 1 | Refills: 0 | Status: COMPLETED | COMMUNITY
Start: 2022-05-23 | End: 2022-05-23

## 2022-05-23 RX ORDER — TESTOSTERONE CYPIONATE 200 MG/ML
200 INJECTION, SOLUTION INTRAMUSCULAR
Refills: 0 | Status: COMPLETED | OUTPATIENT
Start: 2022-05-23

## 2022-05-23 RX ADMIN — TESTOSTERONE CYPIONATE 0 MG/ML: 200 INJECTION, SOLUTION INTRAMUSCULAR at 00:00

## 2022-05-23 NOTE — ASSESSMENT
[FreeTextEntry1] : Performed blood draw for CBC, PSA, testosterone.\par Last testosterone, total serum was 293.0 ng/dL.\par \par \par Today 4th testosterone injection. Will F/U after blood results to evaluate the following planning.

## 2022-05-23 NOTE — HISTORY OF PRESENT ILLNESS
[FreeTextEntry1] : 60y/o male with history of low testosterone. Coming to office for his fourth injection.\par Inquiring for low energy.

## 2022-05-24 LAB
BASOPHILS # BLD AUTO: 0.02 K/UL
BASOPHILS NFR BLD AUTO: 0.4 %
EOSINOPHIL # BLD AUTO: 0.24 K/UL
EOSINOPHIL NFR BLD AUTO: 4.3 %
HCT VFR BLD CALC: 52.9 %
HGB BLD-MCNC: 16.8 G/DL
IMM GRANULOCYTES NFR BLD AUTO: 0.5 %
LYMPHOCYTES # BLD AUTO: 1.31 K/UL
LYMPHOCYTES NFR BLD AUTO: 23.2 %
MAN DIFF?: NORMAL
MCHC RBC-ENTMCNC: 28.3 PG
MCHC RBC-ENTMCNC: 31.8 GM/DL
MCV RBC AUTO: 89.2 FL
MONOCYTES # BLD AUTO: 0.46 K/UL
MONOCYTES NFR BLD AUTO: 8.2 %
NEUTROPHILS # BLD AUTO: 3.58 K/UL
NEUTROPHILS NFR BLD AUTO: 63.4 %
PLATELET # BLD AUTO: 255 K/UL
PSA SERPL-MCNC: 0.72 NG/ML
RBC # BLD: 5.93 M/UL
RBC # FLD: 14.6 %
TESTOST SERPL-MCNC: 156 NG/DL
WBC # FLD AUTO: 5.64 K/UL

## 2022-06-02 ENCOUNTER — APPOINTMENT (OUTPATIENT)
Dept: NEUROSURGERY | Facility: CLINIC | Age: 62
End: 2022-06-02

## 2022-06-03 ENCOUNTER — NON-APPOINTMENT (OUTPATIENT)
Age: 62
End: 2022-06-03

## 2022-06-05 LAB
BASOPHILS # BLD AUTO: 0.05 K/UL
BASOPHILS NFR BLD AUTO: 0.7 %
EOSINOPHIL # BLD AUTO: 0.49 K/UL
EOSINOPHIL NFR BLD AUTO: 6.5 %
HCT VFR BLD CALC: 49.1 %
HGB BLD-MCNC: 15.7 G/DL
IMM GRANULOCYTES NFR BLD AUTO: 0.5 %
LYMPHOCYTES # BLD AUTO: 1.47 K/UL
LYMPHOCYTES NFR BLD AUTO: 19.5 %
MAN DIFF?: NORMAL
MCHC RBC-ENTMCNC: 29.1 PG
MCHC RBC-ENTMCNC: 32 GM/DL
MCV RBC AUTO: 91.1 FL
MONOCYTES # BLD AUTO: 0.55 K/UL
MONOCYTES NFR BLD AUTO: 7.3 %
NEUTROPHILS # BLD AUTO: 4.95 K/UL
NEUTROPHILS NFR BLD AUTO: 65.5 %
PLATELET # BLD AUTO: 301 K/UL
RBC # BLD: 5.39 M/UL
RBC # FLD: 14.3 %
WBC # FLD AUTO: 7.55 K/UL

## 2022-06-07 ENCOUNTER — APPOINTMENT (OUTPATIENT)
Dept: UROLOGY | Facility: CLINIC | Age: 62
End: 2022-06-07
Payer: COMMERCIAL

## 2022-06-07 VITALS
DIASTOLIC BLOOD PRESSURE: 75 MMHG | OXYGEN SATURATION: 97 % | HEART RATE: 63 BPM | TEMPERATURE: 97.8 F | SYSTOLIC BLOOD PRESSURE: 145 MMHG

## 2022-06-07 PROCEDURE — 96372 THER/PROPH/DIAG INJ SC/IM: CPT

## 2022-06-07 PROCEDURE — 99212 OFFICE O/P EST SF 10 MIN: CPT | Mod: 25

## 2022-06-07 RX ORDER — TESTOSTERONE CYPIONATE 200 MG/ML
200 INJECTION, SOLUTION INTRAMUSCULAR
Refills: 0 | Status: COMPLETED | OUTPATIENT
Start: 2022-06-07

## 2022-06-07 RX ORDER — TESTOSTERONE CYPIONATE 200 MG/ML
200 INJECTION, SOLUTION INTRAMUSCULAR
Qty: 1 | Refills: 0 | Status: COMPLETED | COMMUNITY
Start: 2022-06-07 | End: 2022-06-07

## 2022-06-07 RX ADMIN — TESTOSTERONE CYPIONATE 0 MG/ML: 200 INJECTION, SOLUTION INTRAMUSCULAR at 00:00

## 2022-06-07 NOTE — HISTORY OF PRESENT ILLNESS
[FreeTextEntry1] : GUILLE SILVA is a 61 year old M who presents with concerns about taking T if it is not achieving the desired effects. He still has low sex drive and low libido. He is here for his 5th one, but still has low readings, yet I tried to explain that this represents the trough.\par \par Repeat H/H was normal.

## 2022-06-07 NOTE — ASSESSMENT
[FreeTextEntry1] : pt taught how to draw med and will consider learning self injection\par \par We talked about the risk of low T, as well as risks of replacement again. He seems very nervous.  Wants to know if any point to it or if we need to increase dose.  Told him we would first consider giving 100 mg injection every week, or we can send him for a T level about 1 wk after dosing.\par \par Unfortunately, there is no guarantee that it will improve his mood or his libido.

## 2022-06-08 ENCOUNTER — TRANSCRIPTION ENCOUNTER (OUTPATIENT)
Age: 62
End: 2022-06-08

## 2022-06-22 ENCOUNTER — APPOINTMENT (OUTPATIENT)
Dept: UROLOGY | Facility: CLINIC | Age: 62
End: 2022-06-22
Payer: COMMERCIAL

## 2022-06-22 VITALS
HEART RATE: 75 BPM | OXYGEN SATURATION: 97 % | DIASTOLIC BLOOD PRESSURE: 89 MMHG | SYSTOLIC BLOOD PRESSURE: 158 MMHG | TEMPERATURE: 98.7 F

## 2022-06-22 PROCEDURE — 99212 OFFICE O/P EST SF 10 MIN: CPT | Mod: 25

## 2022-06-22 PROCEDURE — 96372 THER/PROPH/DIAG INJ SC/IM: CPT

## 2022-06-22 RX ORDER — TESTOSTERONE CYPIONATE 200 MG/ML
200 INJECTION, SOLUTION INTRAMUSCULAR
Refills: 0 | Status: COMPLETED | OUTPATIENT
Start: 2022-06-22

## 2022-06-22 RX ORDER — TESTOSTERONE CYPIONATE 200 MG/ML
200 INJECTION, SOLUTION INTRAMUSCULAR ONCE
Qty: 1 | Refills: 0 | Status: COMPLETED | COMMUNITY
Start: 2022-06-22 | End: 2022-06-22

## 2022-06-22 RX ADMIN — TESTOSTERONE CYPIONATE 0 MG/ML: 200 INJECTION, SOLUTION INTRAMUSCULAR at 00:00

## 2022-06-22 NOTE — ED STATDOCS - PROGRESS NOTE DETAILS
Procedure/Dx/Injuries: 48y F s/p sleeve gastrectomy complicated by a leak (5/18 Dr Tequila GLEZ) transferred to [facility] for endoscopic mgmt.  CT Abdomen and Pelvis w/ Oral Cont and w/ IV Cont (06.13.22 @ 17:09) >  Prior sleeve gastrectomy with irregularity of the suture line and   adjacent fluid collection measuring up to 6.2 cm. Findings suspicious for   contained leak and/or abscess formation.  Events of past 24 hours: Patient seen and examined at bedside, HD stable.  pt has tolerated small amts of clear liquids - to start fulls at lunch today.    Vital Signs Last 24 Hrs  T(C): 36.5 (22 Jun 2022 07:22), Max: 37.1 (21 Jun 2022 15:31)  T(F): 97.7 (22 Jun 2022 07:22), Max: 98.8 (21 Jun 2022 15:31)  HR: 59 (22 Jun 2022 07:22) (59 - 73)  BP: 143/81 (22 Jun 2022 07:22) (127/69 - 143/81)  BP(mean): --  RR: 18 (22 Jun 2022 07:22) (18 - 18)  SpO2: 99% (21 Jun 2022 23:56) (99% - 99%)    MEDICATIONS  (STANDING):  cefTRIAXone   IVPB 1000 milliGRAM(s) IV Intermittent every 24 hours  chlorhexidine 4% Liquid 1 Application(s) Topical <User Schedule>  enoxaparin Injectable 40 milliGRAM(s) SubCutaneous every 24 hours  losartan 100 milliGRAM(s) Oral daily  metroNIDAZOLE  IVPB 500 milliGRAM(s) IV Intermittent every 8 hours  metroNIDAZOLE  IVPB      ondansetron Injectable 4 milliGRAM(s) IV Push every 8 hours  pantoprazole  Injectable 40 milliGRAM(s) IV Push every 12 hours  Parenteral Nutrition - Adult 1 Each (60 mL/Hr) TPN Continuous <Continuous>  Parenteral Nutrition - Adult 1 Each (42 mL/Hr) TPN Continuous <Continuous>                        8.8    6.30  )-----------( 142      ( 22 Jun 2022 07:29 )             27.8   06-22    136  |  104  |  11  ----------------------------<  127<H>  4.0   |  23  |  0.6<L>    Ca    8.4<L>      22 Jun 2022 07:29  Phos  4.2     06-22  Mg     2.2     06-22    TPro  5.5<L>  /  Alb  3.1<L>  /  TBili  <0.2  /  DBili  x   /  AST  11  /  ALT  14  /  AlkPhos  110  06-22    < from: Xray Esophagram Single Contrast (06.20.22 @ 10:20) >   image left PICC tip overlies the SVC. Left basilar   opacity/effusion. An endoscopic clip overlies the epigastrium.    ESOPHAGUS/STOMACH: Following administration ofGastrografin contrast,   there is transit of contrast through the distal esophagus, gastric pouch,   and proximal small bowel without evidence of extraluminal contrast or   obstruction.    The distal thoracic esophagus is normal in caliber and contour. No   intrinsic filling defect or stricture is seen. Normal esophageal   peristalsis is present. The gastroesophageal junction is normally   positioned. Contrast crosses the lower esophageal sphincter without delay.    There is no hiatal hernia.    IMPRESSION:    Limited esophagram demonstrates no extraluminal contrast to suggest leak.    < end of copied text >   signed Debora Valenzuela PA-C Pt seen initially in intake by Dr Tsai.   Pt c/o severe pain in bilateral shoulders, right worse than left and pain in his right hip as well x 2-3 hours. Pt said it started after he tried swallowing hard to get rid of a lump sensation in his throat that he has  had for several months. He soon developed this joint pain, denies CP/SOB/N/V/HA. Pt in obvious discomfort. PMH hypothyroid. PMD Chiki. Pt alert, in obvious discomfort, speaking in full sentences, no signs of trauma. no subcutaneous emphysema. BS +bilateral. ROM limited in bilateral shoulders. 2+ bilateral radial pulses. Plan EKG, labs work, trop x2, cxr, shoulder xray, CT neck to r/o free air, analgesia, reassessment. signed Debora Valenzuela PA-C   Pt still in significant pain. Delay in obtaining images due to trauma in ED. Will order additional analgesia. signed Debora Valenzuela PA-C   Pt still in significant pain. Delay in obtaining images due to trauma in ED. Will order additional analgesia as well as CT angio r/o dissection. Pt agrees with plan of  care. signed Debora Valenzuela PA-C   CT pending. case endorsed to VIOLET Ortiz. signed Debora Valenzuela PA-C   CT pending. case endorsed to VIOLET Ortiz. pt feeling a little better. Pt agrees with plan of  care. Reviewed all imaging results with patient, no acute findings.  Reviewed incidental of lung nodule and he will follow up.  Pain is likely musculoskeletal in nature.  He is reporting relief after toradol, steroids and valium.  Reviewed home care, follow up and return precautions -Helio Ortiz PA-C

## 2022-06-23 RX ORDER — TESTOSTERONE CYPIONATE 200 MG/ML
200 INJECTION, SOLUTION INTRAMUSCULAR WEEKLY
Qty: 9 | Refills: 0 | Status: ACTIVE | COMMUNITY
Start: 2022-03-31 | End: 1900-01-01

## 2022-07-05 ENCOUNTER — RX RENEWAL (OUTPATIENT)
Age: 62
End: 2022-07-05

## 2022-07-06 ENCOUNTER — APPOINTMENT (OUTPATIENT)
Dept: UROLOGY | Facility: CLINIC | Age: 62
End: 2022-07-06

## 2022-07-12 ENCOUNTER — NON-APPOINTMENT (OUTPATIENT)
Age: 62
End: 2022-07-12

## 2022-07-27 ENCOUNTER — APPOINTMENT (OUTPATIENT)
Dept: INTERNAL MEDICINE | Facility: CLINIC | Age: 62
End: 2022-07-27

## 2022-07-27 ENCOUNTER — NON-APPOINTMENT (OUTPATIENT)
Age: 62
End: 2022-07-27

## 2022-07-27 VITALS
BODY MASS INDEX: 32.06 KG/M2 | HEIGHT: 71 IN | TEMPERATURE: 97.9 F | OXYGEN SATURATION: 99 % | WEIGHT: 229 LBS | HEART RATE: 65 BPM

## 2022-07-27 VITALS — DIASTOLIC BLOOD PRESSURE: 80 MMHG | SYSTOLIC BLOOD PRESSURE: 128 MMHG

## 2022-07-27 DIAGNOSIS — R79.89 OTHER SPECIFIED ABNORMAL FINDINGS OF BLOOD CHEMISTRY: ICD-10-CM

## 2022-07-27 DIAGNOSIS — M16.0 BILATERAL PRIMARY OSTEOARTHRITIS OF HIP: ICD-10-CM

## 2022-07-27 PROCEDURE — 36415 COLL VENOUS BLD VENIPUNCTURE: CPT

## 2022-07-27 PROCEDURE — 93000 ELECTROCARDIOGRAM COMPLETE: CPT

## 2022-07-27 PROCEDURE — 99396 PREV VISIT EST AGE 40-64: CPT | Mod: 25

## 2022-07-27 NOTE — REVIEW OF SYSTEMS
[Poor Libido] : poor libido [Joint Pain] : joint pain [Joint Stiffness] : joint stiffness [Fever] : no fever [Chills] : no chills [Discharge] : no discharge [Vision Problems] : no vision problems [Earache] : no earache [Sore Throat] : no sore throat [Chest Pain] : no chest pain [Palpitations] : no palpitations [Lower Ext Edema] : no lower extremity edema [Shortness Of Breath] : no shortness of breath [Wheezing] : no wheezing [Cough] : no cough [Abdominal Pain] : no abdominal pain [Dysuria] : no dysuria [Mole Changes] : no mole changes [Skin Rash] : no skin rash [Dizziness] : no dizziness [Fainting] : no fainting [Confusion] : no confusion [Unsteady Walk] : no ataxia [Depression] : no depression [Easy Bruising] : no easy bruising

## 2022-07-27 NOTE — HISTORY OF PRESENT ILLNESS
[de-identified] : Pt  comes for adult well visit  Pt recently stopped tesosterone because he thought his bp was high  VIewing urology appt  april may and june  bp 135/72  149/73  146/79 and 145/75   Not sure if electronic cuff and/ or large cuff size s/w his arm   Pt stopped tesosterone 3 weeks ago

## 2022-07-27 NOTE — PLAN
[FreeTextEntry1] : no evid of elevated bp may be related to stopping testosterone   advise to hget labs  consult urology and if he goes back on tesosterone to come back and we will recheck bp and advise\par \par needs colonosocpy repeat

## 2022-07-27 NOTE — PHYSICAL EXAM
[No Acute Distress] : no acute distress [Normal Sclera/Conjunctiva] : normal sclera/conjunctiva [PERRL] : pupils equal round and reactive to light [Normal Outer Ear/Nose] : the outer ears and nose were normal in appearance [Normal Oropharynx] : the oropharynx was normal [No JVD] : no jugular venous distention [No Lymphadenopathy] : no lymphadenopathy [No Respiratory Distress] : no respiratory distress  [No Accessory Muscle Use] : no accessory muscle use [Normal Rate] : normal rate  [Regular Rhythm] : with a regular rhythm [No Carotid Bruits] : no carotid bruits [No Edema] : there was no peripheral edema [No Extremity Clubbing/Cyanosis] : no extremity clubbing/cyanosis [Soft] : abdomen soft [Non Tender] : non-tender [Non-distended] : non-distended [No Masses] : no abdominal mass palpated [Normal Posterior Cervical Nodes] : no posterior cervical lymphadenopathy [Normal Anterior Cervical Nodes] : no anterior cervical lymphadenopathy [No Spinal Tenderness] : no spinal tenderness [Grossly Normal Strength/Tone] : grossly normal strength/tone [No Rash] : no rash [No Focal Deficits] : no focal deficits [Normal Gait] : normal gait [Speech Grossly Normal] : speech grossly normal [Memory Grossly Normal] : memory grossly normal [Normal Mood] : the mood was normal [de-identified] : decreased rom neck

## 2022-07-27 NOTE — HEALTH RISK ASSESSMENT
[Never] : Never [Never (0 pts)] : Never (0 points) [No] : In the past 12 months have you used drugs other than those required for medical reasons? No [No falls in past year] : Patient reported no falls in the past year [0] : 2) Feeling down, depressed, or hopeless: Not at all (0) [PHQ-2 Negative - No further assessment needed] : PHQ-2 Negative - No further assessment needed [Audit-CScore] : 0 [[id number]] : 0

## 2022-07-28 LAB
ALBUMIN SERPL ELPH-MCNC: 4.8 G/DL
ALP BLD-CCNC: 72 U/L
ALT SERPL-CCNC: 35 U/L
ANION GAP SERPL CALC-SCNC: 12 MMOL/L
AST SERPL-CCNC: 28 U/L
BASOPHILS # BLD AUTO: 0.04 K/UL
BASOPHILS NFR BLD AUTO: 0.7 %
BILIRUB SERPL-MCNC: 0.5 MG/DL
BUN SERPL-MCNC: 13 MG/DL
CALCIUM SERPL-MCNC: 9.7 MG/DL
CHLORIDE SERPL-SCNC: 105 MMOL/L
CHOLEST SERPL-MCNC: 255 MG/DL
CO2 SERPL-SCNC: 24 MMOL/L
CREAT SERPL-MCNC: 0.94 MG/DL
EGFR: 93 ML/MIN/1.73M2
EOSINOPHIL # BLD AUTO: 0.31 K/UL
EOSINOPHIL NFR BLD AUTO: 5.4 %
ESTIMATED AVERAGE GLUCOSE: 111 MG/DL
GLUCOSE SERPL-MCNC: 76 MG/DL
HBA1C MFR BLD HPLC: 5.5 %
HCT VFR BLD CALC: 52.8 %
HDLC SERPL-MCNC: 69 MG/DL
HGB BLD-MCNC: 17.1 G/DL
IMM GRANULOCYTES NFR BLD AUTO: 0.3 %
LDLC SERPL CALC-MCNC: 128 MG/DL
LYMPHOCYTES # BLD AUTO: 1.52 K/UL
LYMPHOCYTES NFR BLD AUTO: 26.3 %
MAN DIFF?: NORMAL
MCHC RBC-ENTMCNC: 29.1 PG
MCHC RBC-ENTMCNC: 32.4 GM/DL
MCV RBC AUTO: 89.8 FL
MONOCYTES # BLD AUTO: 0.5 K/UL
MONOCYTES NFR BLD AUTO: 8.6 %
NEUTROPHILS # BLD AUTO: 3.4 K/UL
NEUTROPHILS NFR BLD AUTO: 58.7 %
NONHDLC SERPL-MCNC: 186 MG/DL
PLATELET # BLD AUTO: 233 K/UL
POTASSIUM SERPL-SCNC: 4.5 MMOL/L
PROT SERPL-MCNC: 7.1 G/DL
PSA SERPL-MCNC: 0.77 NG/ML
RBC # BLD: 5.88 M/UL
RBC # FLD: 13.5 %
SODIUM SERPL-SCNC: 142 MMOL/L
TRIGL SERPL-MCNC: 286 MG/DL
TSH SERPL-ACNC: 0.17 UIU/ML
WBC # FLD AUTO: 5.79 K/UL

## 2022-08-01 LAB
TESTOST FREE SERPL-MCNC: 6.1 PG/ML
TESTOST SERPL-MCNC: 248 NG/DL

## 2022-08-05 NOTE — PHYSICAL THERAPY INITIAL EVALUATION ADULT - PATIENT PROFILE REVIEW, REHAB EVAL
yes [Never] : Never [2 - 3 times a week (3 pts)] : 2 - 3  times a week (3 points) [1 or 2 (0 pts)] : 1 or 2 (0 points) [No] : In the past 12 months have you used drugs other than those required for medical reasons? No [No falls in past year] : Patient reported no falls in the past year [0] : 2) Feeling down, depressed, or hopeless: Not at all (0) [de-identified] : none [de-identified] : none [Audit-CScore] : 3 [de-identified] : walking [de-identified] : poor [EVY1Thoib] : 0

## 2022-08-26 NOTE — DISCHARGE NOTE ADULT - FUNCTIONAL SCREEN CURRENT LEVEL: BATHING, MLM
SS NOTE: NO COVID TESTING DONE. Pt is on room air. She has a peripheral line. She is on IV Merrem for positive blood cultures. ID is on. No order for PT/OT as yet. SW met with pt today. She resides with her  in their [place] [address] [place] home with ramped entry, pt resides on the main floor. Pt relates that she was I pta with salty childersld and her  drove. Pt relates that she has a rollator at home that she uses. Pt relates that she had a recent hx of [facility] with IV Infusion at home (April 2022). Pt relates that she believes she may need this again and would like to use [facility] again. SW faxed and phoned info to [facility] at [address] [place] [address] Road [facility] [phone]: [phone] and they would accept but they will not have a start of care until next week. SW also shared the [facility] provider list per pt request. Pt's PCP is Dr Jose Bowden DO and she uses [facility] in [place]. SW will need orders and rx for the [facility] AT [facility] if that is the [facility] plan she finally chooses. SS to continue. KHALIF Rabago.8/26/2022.2:08 PM. 2 = assistive person

## 2022-11-07 NOTE — H&P PST ADULT - NSANTHGENDERRD_ENT_A_CORE
chest wall non-tender, breathing is unlabored without accessory muscle use, normal breath sounds
Yes

## 2022-12-20 NOTE — H&P PST ADULT - PMH
oligo/GHTN/abnormal fetal heart rate tracing
ADHD (attention deficit hyperactivity disorder)    Ankylosis  cervical  Hiatal hernia    Hypothyroid    Lumbar disc herniation  L4-5, intermittent right lower leg tingling  Lung nodule  right lower quadrant ; let lung x 2  Lymphadenopathy, thoracic    Muscle atrophy  right leg  Osteoarthritis    Polyp of colon

## 2023-03-06 ENCOUNTER — NON-APPOINTMENT (OUTPATIENT)
Age: 63
End: 2023-03-06

## 2023-03-06 ENCOUNTER — APPOINTMENT (OUTPATIENT)
Dept: ORTHOPEDIC SURGERY | Facility: CLINIC | Age: 63
End: 2023-03-06

## 2023-04-03 NOTE — DISCHARGE NOTE PROVIDER - NSDCFUADDINST_GEN_ALL_CORE_FT
86 - Call your doctor if you experience:  • An increase in pain not controlled by pain medication or change in activity or  position.  • Temperature greater than 101° F.  • Redness, increased swelling or foul smelling drainage from or around the  incision.  • Numbness, tingling or a change in color or temperature of the operative leg.  • Call your doctor immediately if you experience chest pain, shortness of breath or calf pain.

## 2023-04-25 ENCOUNTER — APPOINTMENT (OUTPATIENT)
Dept: ORTHOPEDIC SURGERY | Facility: CLINIC | Age: 63
End: 2023-04-25
Payer: COMMERCIAL

## 2023-04-25 VITALS — DIASTOLIC BLOOD PRESSURE: 78 MMHG | HEART RATE: 67 BPM | SYSTOLIC BLOOD PRESSURE: 122 MMHG

## 2023-04-25 DIAGNOSIS — M23.92 UNSPECIFIED INTERNAL DERANGEMENT OF LEFT KNEE: ICD-10-CM

## 2023-04-25 DIAGNOSIS — M17.11 UNILATERAL PRIMARY OSTEOARTHRITIS, RIGHT KNEE: ICD-10-CM

## 2023-04-25 PROCEDURE — 20610 DRAIN/INJ JOINT/BURSA W/O US: CPT | Mod: RT,LT

## 2023-04-25 PROCEDURE — 99214 OFFICE O/P EST MOD 30 MIN: CPT | Mod: 25

## 2023-04-25 PROCEDURE — 73562 X-RAY EXAM OF KNEE 3: CPT | Mod: LT,RT

## 2023-04-25 RX ORDER — LIDOCAINE HYDROCHLORIDE 10 MG/ML
1 INJECTION, SOLUTION INFILTRATION; PERINEURAL
Refills: 0 | Status: COMPLETED | OUTPATIENT
Start: 2023-04-25

## 2023-04-25 RX ORDER — METHYLPRED ACET/NACL,ISO-OS/PF 40 MG/ML
40 VIAL (ML) INJECTION
Qty: 1 | Refills: 0 | Status: COMPLETED | OUTPATIENT
Start: 2023-04-25

## 2023-04-25 RX ADMIN — METHYLPREDNISOLONE ACETATE MG/ML: 40 INJECTION, SUSPENSION INTRA-ARTICULAR; INTRALESIONAL; INTRAMUSCULAR; SOFT TISSUE at 00:00

## 2023-04-25 RX ADMIN — Medication %: at 00:00

## 2023-04-25 NOTE — DISCUSSION/SUMMARY
[de-identified] : Treatment options were reviewed in detail with the patient.  He had a good response to cortisone injection last year with advise repeating today.  A cortisone injection was done into both knees without any side effects.  Injections were tolerated well.  Reviewed risk benefits and alternatives to injections as well as postinjection care in detail with the patient.  Would like to obtain a MRI of the left knee as well given chronic pain in the setting of normal x-rays.  Patient will follow-up in the office to review the MRI once it is resulted with Dr. Aviles.  He may also continue with anti-inflammatories ice warm moist heat and elevation as needed.  All patient's questions and concerns were addressed to satisfaction.  If any new or worsening symptoms arise advised patient to call the office.

## 2023-04-25 NOTE — PROCEDURE
[Injection] : Injection [Bilateral] : of bilateral [Knee Joint] : knee joint [Osteoarthritis] : Osteoarthritis [Joint Pain] : Joint pain [Patient] : patient [Risk] : risk [Benefits] : benefits [Alternatives] : alternatives [Bleeding] : bleeding [Infection] : infection [Allergic Reaction] : allergic reaction [Verbal Consent Obtained] : verbal consent was obtained prior to the procedure [Ethyl Chloride Spray] : ethyl chloride spray was used as a topical anesthetic [Medial] : medial [Anterior] : anterior [22] : a 22-gauge [1% Lidocaine___(mL)] : [unfilled] mL of 1% Lidocaine [Methylpred. 40mg/mL___(mL)] : [unfilled] mL of 40mg/mL methylprednisolone [Bandage Applied] : a bandage [Tolerated Well] : The patient tolerated the procedure well [None] : none [No Strenuous Activity___day(s)] : avoid strenuous activity for [unfilled] day(s) [PRN] : as needed [FreeTextEntry1] : Chlorhexidine

## 2023-04-25 NOTE — REASON FOR VISIT
[Follow-Up Visit] : a follow-up visit for [Hip Pain] : hip pain [Knee Pain] : knee pain [Joint Replacement Surgery, Aftercare] : joint replacement surgery, aftercare

## 2023-04-25 NOTE — PHYSICAL EXAM
[LE] : Sensory: Intact in bilateral lower extremities [DP] : dorsalis pedis 2+ and symmetric bilaterally [Normal RLE] : Right Lower Extremity: No scars, rashes, lesions, ulcers, skin intact [Normal LLE] : Left Lower Extremity: No scars, rashes, lesions, ulcers, skin intact [Normal Touch] : sensation intact for touch [Normal] : no peripheral adenopathy appreciated [de-identified] : On physical exam of the left knee skin is warm and dry without erythema ecchymosis signs or symptoms of infection superficial or deep.  There is no tenderness to palpation throughout the knee joint.  There is no palpable effusion.  Range of motion is 0-140 without pain or stiffness.  The knee is stable with varus and valgus stress.  There is a negative anterior posterior drawer.  Sensation is intact throughout the distal lower extremity.  Strength is well-maintained in all planes.  There is negative Homans exam.  2+ dorsalis pedis pulse. Equivocal Vinicio's. \par \par On physical exam of the right knee skin is warm and dry without erythema ecchymosis signs or symptoms of infection superficial or deep.  There is no tenderness to palpation throughout the knee joint.  There is no palpable effusion.  Range of motion is 0-130 without pain or stiffness.  The knee is stable with varus and valgus stress.  There is a negative anterior posterior drawer.  Sensation is intact throughout the distal lower extremity.  Strength is well-maintained in all planes.  There is negative Homans exam.  2+ dorsalis pedis pulse.  [de-identified] : 3 views of bilateral knees were performed in the office today.  X-rays of the right knee show moderate medial joint line narrowing.  X-rays of the left knee show no obvious bony abnormalities.  No gross bony abnormalities in either knee.  No evidence of fracture.

## 2023-04-25 NOTE — HISTORY OF PRESENT ILLNESS
[Worsening] : worsening [8] : a minimum pain level of 8/10 [Sitting] : sitting [Standing] : standing [Intermit.] : ~He/She~ states the symptoms seem to be intermittent [Bending] : worsened by bending [Direct Pressure] : worsened by direct pressure [Walking] : worsened by walking [Acetaminophen] : relieved by acetaminophen [NSAIDs] : relieved by nonsteroidal anti-inflammatory drugs [Rest] : relieved by rest [de-identified] : 62-year-old male presents with bilateral knee pain ongoing and worsening over the last year.  Was seen last year for which he received a cortisone injection into the right knee which provided relief up until a few months ago.  He states that the pain is worse in the right knee which she rates as an 8 out of 10 and a 7 out of 10 in the left knee.  In the right knee the pain worsens with activity.  He is taking Celebrex with some improvement.  He notes no history of any injuries.  Pain does not radiate.  Pain is localized to the knee bilaterally.  He also is status post left total hip replacement on 12/8/2021.  He states he is experiencing no pain however he is still experiencing numbness in the lateral thigh.  He is also having some swelling throughout the left lower extremity.\par Denies any fevers chills chest pain calf pain shortness of breath

## 2023-05-02 ENCOUNTER — APPOINTMENT (OUTPATIENT)
Dept: ORTHOPEDIC SURGERY | Facility: CLINIC | Age: 63
End: 2023-05-02

## 2023-05-05 NOTE — ASU PATIENT PROFILE, ADULT - NS PRO AD PATIENT TYPE ON CHART
No step offs, deformities, abrasions or pain noted to back.       Nisa Pittman RN  05/05/23 0929 Health Care Proxy (HCP)

## 2023-05-09 ENCOUNTER — APPOINTMENT (OUTPATIENT)
Dept: INTERNAL MEDICINE | Facility: CLINIC | Age: 63
End: 2023-05-09
Payer: COMMERCIAL

## 2023-05-09 VITALS
HEART RATE: 54 BPM | SYSTOLIC BLOOD PRESSURE: 123 MMHG | WEIGHT: 223 LBS | OXYGEN SATURATION: 98 % | TEMPERATURE: 97.7 F | BODY MASS INDEX: 31.22 KG/M2 | DIASTOLIC BLOOD PRESSURE: 82 MMHG | HEIGHT: 71 IN

## 2023-05-09 DIAGNOSIS — M48.10 ANKYLOSING HYPEROSTOSIS [FORESTIER], SITE UNSPECIFIED: ICD-10-CM

## 2023-05-09 DIAGNOSIS — E29.1 TESTICULAR HYPOFUNCTION: ICD-10-CM

## 2023-05-09 DIAGNOSIS — Z96.641 PRESENCE OF RIGHT ARTIFICIAL HIP JOINT: ICD-10-CM

## 2023-05-09 DIAGNOSIS — R91.8 OTHER NONSPECIFIC ABNORMAL FINDING OF LUNG FIELD: ICD-10-CM

## 2023-05-09 PROCEDURE — 99204 OFFICE O/P NEW MOD 45 MIN: CPT | Mod: 25

## 2023-05-09 NOTE — PLAN
[FreeTextEntry1] : Laboratory testing was done including the above noted testosterone level CBC CMP PSA and thyroid profile he will return in 1 week to review results\par CAT scan without contrast of the chest was ordered\par He referred to rheumatology and pulmonology Dr. Padilla

## 2023-05-09 NOTE — HISTORY OF PRESENT ILLNESS
[FreeTextEntry8] : Patient states she has a history of pulmonary nodules in the past for which she was following with Peter Weil pulmonologist\par He also requests blood work which will be done today values thyroid low testosterone levels and general health

## 2023-05-16 LAB
ALBUMIN SERPL ELPH-MCNC: 5 G/DL
ALP BLD-CCNC: 84 U/L
ALT SERPL-CCNC: 27 U/L
ANION GAP SERPL CALC-SCNC: 14 MMOL/L
APPEARANCE: CLEAR
AST SERPL-CCNC: 17 U/L
BASOPHILS # BLD AUTO: 0.02 K/UL
BASOPHILS NFR BLD AUTO: 0.2 %
BILIRUB SERPL-MCNC: 0.7 MG/DL
BILIRUBIN URINE: NEGATIVE
BLOOD URINE: NEGATIVE
BUN SERPL-MCNC: 18 MG/DL
CALCIUM SERPL-MCNC: 9.7 MG/DL
CHLORIDE SERPL-SCNC: 104 MMOL/L
CHOLEST SERPL-MCNC: 255 MG/DL
CO2 SERPL-SCNC: 20 MMOL/L
COLOR: NORMAL
CREAT SERPL-MCNC: 0.89 MG/DL
EGFR: 98 ML/MIN/1.73M2
EOSINOPHIL # BLD AUTO: 0.14 K/UL
EOSINOPHIL NFR BLD AUTO: 1.3 %
ESTIMATED AVERAGE GLUCOSE: 111 MG/DL
GLUCOSE QUALITATIVE U: NEGATIVE MG/DL
GLUCOSE SERPL-MCNC: 99 MG/DL
HBA1C MFR BLD HPLC: 5.5 %
HCT VFR BLD CALC: 48.2 %
HDLC SERPL-MCNC: 95 MG/DL
HGB BLD-MCNC: 16.1 G/DL
IMM GRANULOCYTES NFR BLD AUTO: 0.6 %
KETONES URINE: NEGATIVE MG/DL
LDLC SERPL CALC-MCNC: 142 MG/DL
LEUKOCYTE ESTERASE URINE: NEGATIVE
LYMPHOCYTES # BLD AUTO: 1.99 K/UL
LYMPHOCYTES NFR BLD AUTO: 18.9 %
MAN DIFF?: NORMAL
MCHC RBC-ENTMCNC: 28.5 PG
MCHC RBC-ENTMCNC: 33.4 GM/DL
MCV RBC AUTO: 85.3 FL
MONOCYTES # BLD AUTO: 0.65 K/UL
MONOCYTES NFR BLD AUTO: 6.2 %
NEUTROPHILS # BLD AUTO: 7.67 K/UL
NEUTROPHILS NFR BLD AUTO: 72.8 %
NITRITE URINE: NEGATIVE
NONHDLC SERPL-MCNC: 160 MG/DL
PH URINE: 5
PLATELET # BLD AUTO: 235 K/UL
POTASSIUM SERPL-SCNC: 4.2 MMOL/L
PROT SERPL-MCNC: 7.3 G/DL
PROTEIN URINE: NEGATIVE MG/DL
PSA SERPL-MCNC: 0.63 NG/ML
RBC # BLD: 5.65 M/UL
RBC # FLD: 13.2 %
SODIUM SERPL-SCNC: 138 MMOL/L
SPECIFIC GRAVITY URINE: 1.03
T3 SERPL-MCNC: 76 NG/DL
T3FREE SERPL-MCNC: 2.46 PG/ML
T4 FREE SERPL-MCNC: 1.7 NG/DL
TESTOST SERPL-MCNC: 207 NG/DL
TRIGL SERPL-MCNC: 91 MG/DL
TSH SERPL-ACNC: 0.03 UIU/ML
URATE SERPL-MCNC: 6.9 MG/DL
UROBILINOGEN URINE: 0.2 MG/DL
WBC # FLD AUTO: 10.53 K/UL

## 2023-07-12 ENCOUNTER — EMERGENCY (EMERGENCY)
Facility: HOSPITAL | Age: 63
LOS: 1 days | Discharge: HOME | End: 2023-07-12
Attending: EMERGENCY MEDICINE | Admitting: EMERGENCY MEDICINE
Payer: COMMERCIAL

## 2023-07-12 VITALS
DIASTOLIC BLOOD PRESSURE: 74 MMHG | RESPIRATION RATE: 18 BRPM | HEIGHT: 71 IN | HEART RATE: 72 BPM | SYSTOLIC BLOOD PRESSURE: 128 MMHG | OXYGEN SATURATION: 98 % | TEMPERATURE: 99 F | WEIGHT: 217.82 LBS

## 2023-07-12 VITALS
RESPIRATION RATE: 18 BRPM | TEMPERATURE: 98 F | SYSTOLIC BLOOD PRESSURE: 128 MMHG | DIASTOLIC BLOOD PRESSURE: 74 MMHG | HEART RATE: 65 BPM | OXYGEN SATURATION: 97 %

## 2023-07-12 DIAGNOSIS — Z90.89 ACQUIRED ABSENCE OF OTHER ORGANS: Chronic | ICD-10-CM

## 2023-07-12 DIAGNOSIS — Z96.641 PRESENCE OF RIGHT ARTIFICIAL HIP JOINT: Chronic | ICD-10-CM

## 2023-07-12 DIAGNOSIS — Z98.890 OTHER SPECIFIED POSTPROCEDURAL STATES: Chronic | ICD-10-CM

## 2023-07-12 DIAGNOSIS — Z90.49 ACQUIRED ABSENCE OF OTHER SPECIFIED PARTS OF DIGESTIVE TRACT: Chronic | ICD-10-CM

## 2023-07-12 LAB
ALBUMIN SERPL ELPH-MCNC: 4.4 G/DL — SIGNIFICANT CHANGE UP (ref 3.3–5)
ALP SERPL-CCNC: 77 U/L — SIGNIFICANT CHANGE UP (ref 30–120)
ALT FLD-CCNC: 37 U/L DA — SIGNIFICANT CHANGE UP (ref 10–60)
ANION GAP SERPL CALC-SCNC: 12 MMOL/L — SIGNIFICANT CHANGE UP (ref 5–17)
AST SERPL-CCNC: 26 U/L — SIGNIFICANT CHANGE UP (ref 10–40)
BASOPHILS # BLD AUTO: 0.02 K/UL — SIGNIFICANT CHANGE UP (ref 0–0.2)
BASOPHILS NFR BLD AUTO: 0.2 % — SIGNIFICANT CHANGE UP (ref 0–2)
BILIRUB SERPL-MCNC: 1.3 MG/DL — HIGH (ref 0.2–1.2)
BUN SERPL-MCNC: 22 MG/DL — SIGNIFICANT CHANGE UP (ref 7–23)
CALCIUM SERPL-MCNC: 9.8 MG/DL — SIGNIFICANT CHANGE UP (ref 8.4–10.5)
CHLORIDE SERPL-SCNC: 106 MMOL/L — SIGNIFICANT CHANGE UP (ref 96–108)
CO2 SERPL-SCNC: 23 MMOL/L — SIGNIFICANT CHANGE UP (ref 22–31)
CREAT SERPL-MCNC: 1.2 MG/DL — SIGNIFICANT CHANGE UP (ref 0.5–1.3)
EGFR: 69 ML/MIN/1.73M2 — SIGNIFICANT CHANGE UP
EOSINOPHIL # BLD AUTO: 0.06 K/UL — SIGNIFICANT CHANGE UP (ref 0–0.5)
EOSINOPHIL NFR BLD AUTO: 0.6 % — SIGNIFICANT CHANGE UP (ref 0–6)
ETHANOL SERPL-MCNC: <3 MG/DL — SIGNIFICANT CHANGE UP (ref 0–3)
GLUCOSE SERPL-MCNC: 87 MG/DL — SIGNIFICANT CHANGE UP (ref 70–99)
HCT VFR BLD CALC: 45.4 % — SIGNIFICANT CHANGE UP (ref 39–50)
HGB BLD-MCNC: 15.4 G/DL — SIGNIFICANT CHANGE UP (ref 13–17)
IMM GRANULOCYTES NFR BLD AUTO: 0.3 % — SIGNIFICANT CHANGE UP (ref 0–0.9)
LYMPHOCYTES # BLD AUTO: 1.14 K/UL — SIGNIFICANT CHANGE UP (ref 1–3.3)
LYMPHOCYTES # BLD AUTO: 10.8 % — LOW (ref 13–44)
MAGNESIUM SERPL-MCNC: 2 MG/DL — SIGNIFICANT CHANGE UP (ref 1.6–2.6)
MCHC RBC-ENTMCNC: 28.8 PG — SIGNIFICANT CHANGE UP (ref 27–34)
MCHC RBC-ENTMCNC: 33.9 GM/DL — SIGNIFICANT CHANGE UP (ref 32–36)
MCV RBC AUTO: 85 FL — SIGNIFICANT CHANGE UP (ref 80–100)
MONOCYTES # BLD AUTO: 0.77 K/UL — SIGNIFICANT CHANGE UP (ref 0–0.9)
MONOCYTES NFR BLD AUTO: 7.3 % — SIGNIFICANT CHANGE UP (ref 2–14)
NEUTROPHILS # BLD AUTO: 8.53 K/UL — HIGH (ref 1.8–7.4)
NEUTROPHILS NFR BLD AUTO: 80.8 % — HIGH (ref 43–77)
NRBC # BLD: 0 /100 WBCS — SIGNIFICANT CHANGE UP (ref 0–0)
PLATELET # BLD AUTO: 232 K/UL — SIGNIFICANT CHANGE UP (ref 150–400)
POTASSIUM SERPL-MCNC: 3.9 MMOL/L — SIGNIFICANT CHANGE UP (ref 3.5–5.3)
POTASSIUM SERPL-SCNC: 3.9 MMOL/L — SIGNIFICANT CHANGE UP (ref 3.5–5.3)
PROT SERPL-MCNC: 7.5 G/DL — SIGNIFICANT CHANGE UP (ref 6–8.3)
RBC # BLD: 5.34 M/UL — SIGNIFICANT CHANGE UP (ref 4.2–5.8)
RBC # FLD: 13.2 % — SIGNIFICANT CHANGE UP (ref 10.3–14.5)
SODIUM SERPL-SCNC: 141 MMOL/L — SIGNIFICANT CHANGE UP (ref 135–145)
TROPONIN I, HIGH SENSITIVITY RESULT: 17.6 NG/L — SIGNIFICANT CHANGE UP
WBC # BLD: 10.55 K/UL — HIGH (ref 3.8–10.5)
WBC # FLD AUTO: 10.55 K/UL — HIGH (ref 3.8–10.5)

## 2023-07-12 PROCEDURE — 82962 GLUCOSE BLOOD TEST: CPT

## 2023-07-12 PROCEDURE — 71045 X-RAY EXAM CHEST 1 VIEW: CPT

## 2023-07-12 PROCEDURE — 70498 CT ANGIOGRAPHY NECK: CPT | Mod: 26,MA

## 2023-07-12 PROCEDURE — 70496 CT ANGIOGRAPHY HEAD: CPT | Mod: 26,MA

## 2023-07-12 PROCEDURE — 83735 ASSAY OF MAGNESIUM: CPT

## 2023-07-12 PROCEDURE — 96360 HYDRATION IV INFUSION INIT: CPT | Mod: XU

## 2023-07-12 PROCEDURE — 70450 CT HEAD/BRAIN W/O DYE: CPT | Mod: 26,MA,59

## 2023-07-12 PROCEDURE — 85025 COMPLETE CBC W/AUTO DIFF WBC: CPT

## 2023-07-12 PROCEDURE — 71045 X-RAY EXAM CHEST 1 VIEW: CPT | Mod: 26

## 2023-07-12 PROCEDURE — 99285 EMERGENCY DEPT VISIT HI MDM: CPT | Mod: 25

## 2023-07-12 PROCEDURE — 80053 COMPREHEN METABOLIC PANEL: CPT

## 2023-07-12 PROCEDURE — 70450 CT HEAD/BRAIN W/O DYE: CPT | Mod: MA

## 2023-07-12 PROCEDURE — 99285 EMERGENCY DEPT VISIT HI MDM: CPT

## 2023-07-12 PROCEDURE — 36415 COLL VENOUS BLD VENIPUNCTURE: CPT

## 2023-07-12 PROCEDURE — 80307 DRUG TEST PRSMV CHEM ANLYZR: CPT

## 2023-07-12 PROCEDURE — 70496 CT ANGIOGRAPHY HEAD: CPT | Mod: MA

## 2023-07-12 PROCEDURE — 84484 ASSAY OF TROPONIN QUANT: CPT

## 2023-07-12 PROCEDURE — 93010 ELECTROCARDIOGRAM REPORT: CPT

## 2023-07-12 PROCEDURE — 70498 CT ANGIOGRAPHY NECK: CPT | Mod: MA

## 2023-07-12 PROCEDURE — 93005 ELECTROCARDIOGRAM TRACING: CPT

## 2023-07-12 RX ORDER — ONDANSETRON 8 MG/1
4 TABLET, FILM COATED ORAL ONCE
Refills: 0 | Status: COMPLETED | OUTPATIENT
Start: 2023-07-12 | End: 2023-07-12

## 2023-07-12 RX ORDER — SODIUM CHLORIDE 9 MG/ML
1000 INJECTION INTRAMUSCULAR; INTRAVENOUS; SUBCUTANEOUS ONCE
Refills: 0 | Status: COMPLETED | OUTPATIENT
Start: 2023-07-12 | End: 2023-07-12

## 2023-07-12 RX ADMIN — SODIUM CHLORIDE 1000 MILLILITER(S): 9 INJECTION INTRAMUSCULAR; INTRAVENOUS; SUBCUTANEOUS at 16:08

## 2023-07-12 RX ADMIN — SODIUM CHLORIDE 1000 MILLILITER(S): 9 INJECTION INTRAMUSCULAR; INTRAVENOUS; SUBCUTANEOUS at 15:09

## 2023-07-12 NOTE — ED PROVIDER NOTE - OBJECTIVE STATEMENT
62 M hx levothyroxine, b/l hip replacement, neuropathy p/w near-syncopal episode. States he was doing work around the house (outside, inside), was drinking a lot of water due to heat. After approx 1-1.5 hours he started feeling dizzy, was trying to get up the few flights of stairs to his apartment, felt worse with trouble breathing, felt shaky, like he was going to pass out, vision blurred, vomited. States currently feeling better but feels a bit weak. Denies etoh since February. Nonsmoker. Denies illicit drug use.

## 2023-07-12 NOTE — ED PROVIDER NOTE - CLINICAL SUMMARY MEDICAL DECISION MAKING FREE TEXT BOX
62 M hx levothyroxine, b/l hip replacement, neuropathy p/w near-syncopal episode. States he was doing work around the house (outside, inside), was drinking a lot of water due to heat. After approx 1-1.5 hours he started feeling dizzy, was trying to get up the few flights of stairs to his apartment, felt worse with trouble breathing, felt shaky, like he was going to pass out, vision blurred, vomited. States currently feeling better but feels a bit weak. Denies etoh since February. Nonsmoker. Denies illicit drug use.    VSS Afebrile, NAD  HEENT - clear  PERRL EOMI  Neck supple  lungs clear  Cor S1S2 RR - MGR  Abd soft nontender, no mass or HSM, no rebound  Ext FROM intact, no edema  Neuro Intact, no deficits.  Skin Warm and dry no rash.  Imp-Near syncopal episode possibly heat exhaustion or dehydration doubt cardiac or brain event but will do CT CTA and labs and EKG and reassess.

## 2023-07-12 NOTE — ED ADULT NURSE NOTE - OBJECTIVE STATEMENT
Pt came in for dizziness , nausea and vomited while working in and out his house today. Pt also have generalized weakness  No slurred speech No arm drift No facial drooping noted

## 2023-07-12 NOTE — ED PROVIDER NOTE - PATIENT PORTAL LINK FT
You can access the FollowMyHealth Patient Portal offered by [facility] by registering at the following website: [email or web address]. By joining [employer]’s FollowMyHealth portal, you will also be able to view your health information using other applications (apps) compatible with our system.

## 2023-07-12 NOTE — ED PROVIDER NOTE - NSFOLLOWUPINSTRUCTIONS_ED_ALL_ED_FT
Stay hydrated.  Follow up with your cardiologist as planned.  Return for worsening or concerning symptoms.      Near-syncope is when you suddenly feel like you might pass out or faint. This may also be called presyncope. During an episode of near-syncope, you may:  Feel dizzy, weak, or light-headed. It may feel like the room is spinning.  Feel like you may vomit (nauseous).  See spots or see all white or all black.  Have cold, clammy skin.  Feel warm and sweaty.  Hear ringing in your ears.  This condition is caused by a sudden decrease in blood flow to the brain. This can result from many causes, but most of those causes are not dangerous. However, near-syncope may be a sign of a serious medical problem, so it is important to seek medical care.    Follow these instructions at home:  Medicines    Take over-the-counter and prescription medicines only as told by your doctor.  If you are taking blood pressure or heart medicine, get up slowly and spend many minutes getting ready to sit and then stand. This can help with dizziness.  Lifestyle    Do not drive, use machinery, or play sports until your doctor says it is okay.  Do not drink alcohol.  Do not smoke or use any products that contain nicotine or tobacco. If you need help quitting, ask your doctor.  Avoid hot tubs and saunas.  General instructions    Be aware of any changes in your symptoms.  Talk with your doctor about your symptoms. You may need to have testing to help find the cause.  If you start to feel like you might pass out, sit or lie down right away. If sitting, lower your head down between your legs. If lying down, raise (elevate) your feet above the level of your heart.  Breathe deeply and steadily. Wait until all of the symptoms are gone.  Have someone stay with you until you feel better.  Drink enough fluid to keep your pee (urine) pale yellow.  Avoid standing for a long time. If you must stand for a long time, do movements such as:  Moving your legs.  Crossing your legs.  Flexing and stretching your leg muscles.  Squatting.  Keep all follow-up visits.  Contact a doctor if:  You continue to have episodes of near fainting.  Get help right away if:  You pass out or faint.  You have any of these symptoms:  Fast or uneven heartbeats (palpitations).  Pain in your chest, belly, or back.  Shortness of breath.  You have a seizure.  You have a very bad headache.  You are confused.  You have trouble seeing.  You are very weak.  You have trouble walking.  You are bleeding from your mouth or butt.  You have black or tarry poop (stool).  These symptoms may be an emergency. Get help right away. Call your local emergency services (911 in the [place]  Do not wait to see if the symptoms will go away.  Do not drive yourself to the hospital.  Summary  Near-syncope is when you suddenly feel like you might pass out or faint.  This condition is caused by a sudden decrease in blood flow to the brain.  Near-syncope may be a sign of a serious medical problem, so it is important to seek medical care.  If you start to feel like you might pass out, sit or lie down right away. If sitting, lower your head down between your legs. If lying down, raise (elevate) your feet above the level of your heart.  Talk with your doctor about your symptoms. You may need to have testing to help find the cause.

## 2023-07-12 NOTE — ED PROVIDER NOTE - NEUROLOGICAL, MLM
Alert and oriented, no focal deficits, no motor or sensory deficits. No pronator drift. Normal coordination.

## 2023-07-12 NOTE — ED ADULT TRIAGE NOTE - CHIEF COMPLAINT QUOTE
" I was working in the yard for about an hour or so, I started to get increasingly short of breath, I started feeling weak, dizzy, my eyes got blurry "  Accucheck 86

## 2023-07-12 NOTE — ED PROVIDER NOTE - NS ED ATTENDING STATEMENT MOD
This was a shared visit with the JAKOB. I reviewed and verified the documentation and independently performed the documented:

## 2023-07-31 ENCOUNTER — APPOINTMENT (OUTPATIENT)
Dept: ORTHOPEDIC SURGERY | Facility: CLINIC | Age: 63
End: 2023-07-31
Payer: COMMERCIAL

## 2023-07-31 DIAGNOSIS — M25.562 PAIN IN RIGHT KNEE: ICD-10-CM

## 2023-07-31 DIAGNOSIS — M25.561 PAIN IN RIGHT KNEE: ICD-10-CM

## 2023-07-31 PROCEDURE — 99212 OFFICE O/P EST SF 10 MIN: CPT | Mod: 24

## 2023-07-31 NOTE — GOALS
[Patient] : patient [FreeTextEntry7] : F/U of MRI Left Knee MRI reviewed with michael. Full thickness wear if Medial Condyles

## 2023-08-30 ENCOUNTER — APPOINTMENT (OUTPATIENT)
Dept: NEUROSURGERY | Facility: CLINIC | Age: 63
End: 2023-08-30
Payer: COMMERCIAL

## 2023-08-30 VITALS
BODY MASS INDEX: 30.66 KG/M2 | HEIGHT: 71 IN | SYSTOLIC BLOOD PRESSURE: 119 MMHG | WEIGHT: 219 LBS | DIASTOLIC BLOOD PRESSURE: 76 MMHG | HEART RATE: 69 BPM | OXYGEN SATURATION: 98 %

## 2023-08-30 DIAGNOSIS — M54.2 CERVICALGIA: ICD-10-CM

## 2023-08-30 DIAGNOSIS — R13.10 DYSPHAGIA, UNSPECIFIED: ICD-10-CM

## 2023-08-30 DIAGNOSIS — M24.80 OTHER SPECIFIC JOINT DERANGEMENTS OF UNSPECIFIED JOINT, NOT ELSEWHERE CLASSIFIED: ICD-10-CM

## 2023-08-30 DIAGNOSIS — Z78.9 OTHER SPECIFIED HEALTH STATUS: ICD-10-CM

## 2023-08-30 DIAGNOSIS — M43.20 FUSION OF SPINE, SITE UNSPECIFIED: ICD-10-CM

## 2023-08-30 DIAGNOSIS — M54.12 RADICULOPATHY, CERVICAL REGION: ICD-10-CM

## 2023-08-30 DIAGNOSIS — M25.512 PAIN IN RIGHT SHOULDER: ICD-10-CM

## 2023-08-30 DIAGNOSIS — M25.511 PAIN IN RIGHT SHOULDER: ICD-10-CM

## 2023-08-30 DIAGNOSIS — G89.29 PAIN IN RIGHT SHOULDER: ICD-10-CM

## 2023-08-30 PROCEDURE — 99214 OFFICE O/P EST MOD 30 MIN: CPT

## 2023-08-30 NOTE — REASON FOR VISIT
[Follow-Up: _____] : a [unfilled] follow-up visit [Other: _____] : [unfilled] [FreeTextEntry1] : Cervical neck sitffness

## 2023-08-30 NOTE — CONSULT LETTER
[Courtesy Letter:] : I had the pleasure of seeing your patient, [unfilled], in my office today. [Sincerely,] : Sincerely, [FreeTextEntry1] : This very pleasant 62-year-old gentleman is seen in our office for further evaluation of persistent and progressive neck pain as well as multiple joints with arthritic involvement. As you may recall, initially the patient was seen back in 2018 and diagnosed with D-I-S-H, diffuse idiopathic skeletal hypertrophy period. At that time, the patient had profound difficulties with swallowing secondary to large anterior osteophytes at C3-4, which were deflecting and compressing the esophagus right at the level of the vallecula. After undergoing surgery, the patient had good resolution of most of his swallowing difficulties. Most recently, the patient has had increased problems with neck stiffness and also reports new right shoulder pain in addition to progression of his left shoulder pain. The patient was recently seen in [facility] emergency room for acute dizziness. He underwent a stroke workup, which included a CT angiography. No abnormalities of his blood vessels were identified and no stroke was identified. However, what is clearly evident is that the patient has profound progressive changes in his architecture of his cervical spine vertebra.  I've reviewed directly with the patient his most current CT angiogram and used the bone windows to evaluate the cervical spine. The bone hypertrophy shows progressive multi-segment fusion across most of his cervical levels with perhaps the exception of C5-6. There are bridging calcific islands, both anterior and posterior to the vertebra. Of concern, however, is this is creating, especially at C3-4 and C4-5, progressive narrowing of the central spinal canal compared to previous studies. The cranial imaging does not identify any specific changes with respect to the posterior fossa, the basilar artery, or the temporal bones and the labyrinth or cochlear systems.  On examination, the patient clearly has significant progressive changes in neck range of motion. He has some very minimal flexion extension capacity right at the occipital cervical junction. He has profound restriction of rotation and lateral flexion. There is minimal forward flexion capacity at the cervical thoracic junction. The patient also has significant mobility issues with respect to range of motion of both shoulder joints. The patient has radiating pain from the cervical spine to the right shoulder and periscapular region. Otherwise, the patient appears to have normal strength in the upper extremities. It is evident that he has a mild Felipe's response bilaterally in the upper extremities. There is no clonus.  I am very concerned for the patient that his D-I-S-H has continued to progress in an unrelenting manner. There is now concern for bone in the anterior longitudinal ligament creating correction in the posterior longitudinal ligament causing compromise of the patency of the central canal and possibly causing spinal cord compression. It is possible that there is myelomalacia and possibly this is the cause of the patient's perceived balance or dizziness issues. In order to better understand these recent and progressive symptoms, I have asked for the patient to undergo an MRI scan of both the brain and the cervical spine. The patient also has significant pain and limitation in range of motion of both shoulders and I would like him to undergo an x-ray of the shoulder joints. We will make referrals to rheumatology or orthopedic surgery based on the anatomic findings. In the meantime, I have renewed the patient's prescription for Celebrex which may have a helpful role in symptom control but also potentially with the progression of his disease. The patient indicated good understanding of my descriptions and explanations and will return to the office after his cranial and cervical imaging to determine if there are next steps of care. In the setting of spinal cord compression and if there is myelomalacia, then I have discussed with the patient potentially doing a cervical posterior laminectomy procedure. The patient expressed good understanding.  Thank you for kindly including me in the evaluation and support of your patient. Please do not hesitate to contact me should you have any questions or concerns regarding this evaluation or the patient's ongoing follow-up care plan. [FreeTextEntry3] : Huber Clifford MD, PhD, FRCPSC  Attending Neurosurgeon   of Neurosurgery  [facility]  [address], [address] [place] [address]  Office: [phone]  Fax: [phone]

## 2023-08-30 NOTE — REVIEW OF SYSTEMS
[Negative] : Heme/Lymph [Arm Weakness] : no arm weakness [Hand Weakness] : no hand weakness [Leg Weakness] : no leg weakness [Numbness] : no numbness [Tingling] : no tingling [Difficulty Walking] : no difficulty walking [de-identified] : neck stiffness Shoulder pain ROM diminished of neck and shoulders

## 2023-09-18 ENCOUNTER — NON-APPOINTMENT (OUTPATIENT)
Age: 63
End: 2023-09-18

## 2023-09-19 ENCOUNTER — NON-APPOINTMENT (OUTPATIENT)
Age: 63
End: 2023-09-19

## 2023-09-24 PROBLEM — M54.2 CERVICAL PAIN: Status: ACTIVE | Noted: 2018-09-07

## 2023-09-24 PROBLEM — M54.12 CERVICAL RADICULOPATHY: Status: ACTIVE | Noted: 2019-03-06

## 2023-09-24 PROBLEM — M24.80 CERVICAL SPINE CREPITUS: Status: ACTIVE | Noted: 2020-06-10

## 2023-09-24 PROBLEM — M43.20 ANKYLOSIS OF SPINE: Status: ACTIVE | Noted: 2018-09-07

## 2023-09-24 PROBLEM — R13.10 SWALLOWING DIFFICULTY: Status: ACTIVE | Noted: 2018-09-07

## 2023-10-19 ENCOUNTER — APPOINTMENT (OUTPATIENT)
Dept: INTERNAL MEDICINE | Facility: CLINIC | Age: 63
End: 2023-10-19
Payer: COMMERCIAL

## 2023-10-19 ENCOUNTER — NON-APPOINTMENT (OUTPATIENT)
Age: 63
End: 2023-10-19

## 2023-10-19 VITALS
HEIGHT: 71 IN | DIASTOLIC BLOOD PRESSURE: 82 MMHG | TEMPERATURE: 99 F | HEART RATE: 60 BPM | WEIGHT: 215 LBS | BODY MASS INDEX: 30.1 KG/M2 | OXYGEN SATURATION: 96 % | SYSTOLIC BLOOD PRESSURE: 124 MMHG

## 2023-10-19 DIAGNOSIS — R91.8 OTHER NONSPECIFIC ABNORMAL FINDING OF LUNG FIELD: ICD-10-CM

## 2023-10-19 DIAGNOSIS — E03.9 HYPOTHYROIDISM, UNSPECIFIED: ICD-10-CM

## 2023-10-19 DIAGNOSIS — R68.82 DECREASED LIBIDO: ICD-10-CM

## 2023-10-19 DIAGNOSIS — Z00.00 ENCOUNTER FOR GENERAL ADULT MEDICAL EXAMINATION W/OUT ABNORMAL FINDINGS: ICD-10-CM

## 2023-10-19 PROCEDURE — 99396 PREV VISIT EST AGE 40-64: CPT | Mod: 25

## 2023-10-19 PROCEDURE — 93000 ELECTROCARDIOGRAM COMPLETE: CPT

## 2023-10-19 RX ORDER — SILDENAFIL 20 MG/1
20 TABLET ORAL
Qty: 30 | Refills: 1 | Status: DISCONTINUED | COMMUNITY
Start: 2021-08-19 | End: 2023-10-19

## 2023-10-19 RX ORDER — TADALAFIL 20 MG/1
20 TABLET ORAL
Qty: 20 | Refills: 2 | Status: DISCONTINUED | COMMUNITY
Start: 2020-03-09 | End: 2023-10-19

## 2023-10-19 RX ORDER — TADALAFIL 20 MG/1
20 TABLET ORAL
Qty: 20 | Refills: 2 | Status: DISCONTINUED | COMMUNITY
Start: 2022-07-27 | End: 2023-10-19

## 2023-10-20 LAB
ALBUMIN SERPL ELPH-MCNC: 4.8 G/DL
ALP BLD-CCNC: 70 U/L
ALT SERPL-CCNC: 35 U/L
ANION GAP SERPL CALC-SCNC: 13 MMOL/L
APPEARANCE: CLEAR
AST SERPL-CCNC: 28 U/L
BILIRUB SERPL-MCNC: 0.7 MG/DL
BILIRUBIN URINE: ABNORMAL
BLOOD URINE: NEGATIVE
BUN SERPL-MCNC: 15 MG/DL
CALCIUM SERPL-MCNC: 9.8 MG/DL
CHLORIDE SERPL-SCNC: 107 MMOL/L
CHOLEST SERPL-MCNC: 138 MG/DL
CO2 SERPL-SCNC: 24 MMOL/L
COLOR: NORMAL
CREAT SERPL-MCNC: 0.87 MG/DL
EGFR: 99 ML/MIN/1.73M2
ESTIMATED AVERAGE GLUCOSE: 105 MG/DL
GLUCOSE QUALITATIVE U: NEGATIVE MG/DL
GLUCOSE SERPL-MCNC: 86 MG/DL
HBA1C MFR BLD HPLC: 5.3 %
HCT VFR BLD CALC: 46.4 %
HDLC SERPL-MCNC: 69 MG/DL
HGB BLD-MCNC: 15.2 G/DL
KETONES URINE: NEGATIVE MG/DL
LDLC SERPL CALC-MCNC: 50 MG/DL
LEUKOCYTE ESTERASE URINE: NEGATIVE
MCHC RBC-ENTMCNC: 28.2 PG
MCHC RBC-ENTMCNC: 32.8 GM/DL
MCV RBC AUTO: 86.1 FL
NITRITE URINE: NEGATIVE
NONHDLC SERPL-MCNC: 69 MG/DL
PH URINE: 5.5
PLATELET # BLD AUTO: 228 K/UL
POTASSIUM SERPL-SCNC: 4.6 MMOL/L
PROT SERPL-MCNC: 7.2 G/DL
PROTEIN URINE: NORMAL MG/DL
PSA SERPL-MCNC: 0.88 NG/ML
RBC # BLD: 5.39 M/UL
RBC # FLD: 12.9 %
SODIUM SERPL-SCNC: 143 MMOL/L
SPECIFIC GRAVITY URINE: >1.03
T3 SERPL-MCNC: 141 NG/DL
T4 FREE SERPL-MCNC: 2 NG/DL
TRIGL SERPL-MCNC: 108 MG/DL
URATE SERPL-MCNC: 5.2 MG/DL
UROBILINOGEN URINE: 0.2 MG/DL
WBC # FLD AUTO: 7.66 K/UL

## 2023-10-23 LAB
TESTOST FREE SERPL-MCNC: 7.5 PG/ML
TESTOST SERPL-MCNC: 347 NG/DL
TSH SERPL-ACNC: 0.01 UIU/ML

## 2023-12-18 ENCOUNTER — RX RENEWAL (OUTPATIENT)
Age: 63
End: 2023-12-18

## 2023-12-21 RX ORDER — LEVOTHYROXINE SODIUM 0.17 MG/1
175 TABLET ORAL
Qty: 90 | Refills: 3 | Status: ACTIVE | COMMUNITY
Start: 2019-10-18 | End: 1900-01-01

## 2024-02-01 RX ORDER — DULOXETINE HYDROCHLORIDE 20 MG/1
20 CAPSULE, DELAYED RELEASE PELLETS ORAL
Qty: 90 | Refills: 3 | Status: ACTIVE | COMMUNITY
Start: 2020-04-05 | End: 1900-01-01

## 2024-02-26 ENCOUNTER — RX RENEWAL (OUTPATIENT)
Age: 64
End: 2024-02-26

## 2024-02-26 RX ORDER — ATORVASTATIN CALCIUM 10 MG/1
10 TABLET, FILM COATED ORAL
Qty: 90 | Refills: 0 | Status: ACTIVE | COMMUNITY
Start: 2023-07-27 | End: 1900-01-01

## 2024-04-26 ENCOUNTER — APPOINTMENT (OUTPATIENT)
Dept: INTERNAL MEDICINE | Facility: CLINIC | Age: 64
End: 2024-04-26
Payer: COMMERCIAL

## 2024-04-26 VITALS
DIASTOLIC BLOOD PRESSURE: 84 MMHG | TEMPERATURE: 98.2 F | OXYGEN SATURATION: 97 % | SYSTOLIC BLOOD PRESSURE: 127 MMHG | WEIGHT: 224 LBS | HEIGHT: 71 IN | HEART RATE: 63 BPM | BODY MASS INDEX: 31.36 KG/M2

## 2024-04-26 DIAGNOSIS — R42 DIZZINESS AND GIDDINESS: ICD-10-CM

## 2024-04-26 DIAGNOSIS — N52.9 MALE ERECTILE DYSFUNCTION, UNSPECIFIED: ICD-10-CM

## 2024-04-26 DIAGNOSIS — E78.5 HYPERLIPIDEMIA, UNSPECIFIED: ICD-10-CM

## 2024-04-26 DIAGNOSIS — M47.27 OTHER SPONDYLOSIS WITH RADICULOPATHY, LUMBOSACRAL REGION: ICD-10-CM

## 2024-04-26 PROCEDURE — 99214 OFFICE O/P EST MOD 30 MIN: CPT

## 2024-04-26 RX ORDER — SILDENAFIL CITRATE 100 MG/1
100 TABLET, FILM COATED ORAL
Qty: 10 | Refills: 2 | Status: ACTIVE | COMMUNITY
Start: 2024-04-26 | End: 1900-01-01

## 2024-04-26 RX ORDER — MECLIZINE HYDROCHLORIDE 25 MG/1
25 TABLET ORAL
Qty: 20 | Refills: 1 | Status: ACTIVE | COMMUNITY
Start: 2024-04-26 | End: 1900-01-01

## 2024-04-26 NOTE — END OF VISIT
[FreeTextEntry3] :  "I, Ochoa Savage, personally scribed the services dictated to me by Dr. Surinder Ferguson MD in this documentation on 04/26/2024 "   "I Dr. Surinder Ferguson MD, personally performed the services described in this documentation on 04/26/2024 for the patient as scribed by Ochoa Savage in my presence. I have reviewed and verified that all the information is accurate and true."

## 2024-04-26 NOTE — PLAN
[FreeTextEntry1] : Start Meclizine Further instructions pending lab results Given referral for ENT Dr. Christopher Given script for physical therapy

## 2024-04-26 NOTE — HISTORY OF PRESENT ILLNESS
[FreeTextEntry8] : GUILLE SILVA is a 63 year M who presents today for an acute visit for dizziness that began 3 weeks ago. Patient reported that the first instance of dizziness lasted for 5 days before it went away. Patient reports that the dizziness returned yesterday. Patient also complains of lower back pain. Patient has not seen an orthopedist recently, but was diagnosed with herniated discs in the past. Patient has a hx of HLD and hypothyroidism.

## 2024-04-29 LAB
ALBUMIN SERPL ELPH-MCNC: 4.8 G/DL
ALP BLD-CCNC: 73 U/L
ALT SERPL-CCNC: 44 U/L
ANION GAP SERPL CALC-SCNC: 12 MMOL/L
AST SERPL-CCNC: 32 U/L
BILIRUB SERPL-MCNC: 0.6 MG/DL
BUN SERPL-MCNC: 13 MG/DL
CALCIUM SERPL-MCNC: 9.9 MG/DL
CHLORIDE SERPL-SCNC: 106 MMOL/L
CHOLEST SERPL-MCNC: 171 MG/DL
CO2 SERPL-SCNC: 26 MMOL/L
CREAT SERPL-MCNC: 0.96 MG/DL
EGFR: 90 ML/MIN/1.73M2
ESTIMATED AVERAGE GLUCOSE: 108 MG/DL
GLUCOSE SERPL-MCNC: 88 MG/DL
HBA1C MFR BLD HPLC: 5.4 %
HDLC SERPL-MCNC: 76 MG/DL
LDLC SERPL CALC-MCNC: 77 MG/DL
NONHDLC SERPL-MCNC: 95 MG/DL
POTASSIUM SERPL-SCNC: 5 MMOL/L
PROT SERPL-MCNC: 6.9 G/DL
SODIUM SERPL-SCNC: 143 MMOL/L
TRIGL SERPL-MCNC: 103 MG/DL

## 2024-04-30 RX ORDER — SILDENAFIL 100 MG/1
100 TABLET, FILM COATED ORAL
Qty: 20 | Refills: 9 | Status: ACTIVE | COMMUNITY
Start: 2021-10-19 | End: 1900-01-01

## 2024-05-15 ENCOUNTER — NON-APPOINTMENT (OUTPATIENT)
Age: 64
End: 2024-05-15

## 2024-05-15 ENCOUNTER — APPOINTMENT (OUTPATIENT)
Dept: OTOLARYNGOLOGY | Facility: CLINIC | Age: 64
End: 2024-05-15
Payer: COMMERCIAL

## 2024-05-15 VITALS
WEIGHT: 222 LBS | HEART RATE: 62 BPM | BODY MASS INDEX: 31.08 KG/M2 | DIASTOLIC BLOOD PRESSURE: 79 MMHG | HEIGHT: 71 IN | SYSTOLIC BLOOD PRESSURE: 133 MMHG

## 2024-05-15 DIAGNOSIS — R42 DIZZINESS AND GIDDINESS: ICD-10-CM

## 2024-05-15 DIAGNOSIS — H61.819 EXOSTOSIS OF EXTERNAL CANAL, UNSPECIFIED EAR: ICD-10-CM

## 2024-05-15 PROCEDURE — 99203 OFFICE O/P NEW LOW 30 MIN: CPT | Mod: 25

## 2024-05-15 PROCEDURE — 31575 DIAGNOSTIC LARYNGOSCOPY: CPT | Mod: 52

## 2024-05-15 PROCEDURE — 92557 COMPREHENSIVE HEARING TEST: CPT

## 2024-05-15 PROCEDURE — 92567 TYMPANOMETRY: CPT

## 2024-05-15 RX ORDER — BUPROPION HYDROCHLORIDE 300 MG/1
300 TABLET, EXTENDED RELEASE ORAL
Refills: 0 | Status: ACTIVE | COMMUNITY

## 2024-05-15 RX ORDER — ROSUVASTATIN CALCIUM 20 MG/1
20 TABLET, FILM COATED ORAL
Refills: 0 | Status: ACTIVE | COMMUNITY

## 2024-05-15 NOTE — HISTORY OF PRESENT ILLNESS
[de-identified] : DIZZY SPELLS EACH TAKES  3 TO 5 DAYS FOR THE PAST FEW MONTHS DYSPHONIA AT TIMES CERVICAL ARTHRITIS CERVICAL SURGERY MEDICAL HX REVIEWED NO SMOKING

## 2024-05-15 NOTE — ASSESSMENT
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

## 2024-05-15 NOTE — REVIEW OF SYSTEMS
[Dizziness] : dizziness [Vertigo] : vertigo [Lightheadedness] : lightheadedness [Nasal Congestion] : nasal congestion [Problem Snoring] : problem snoring [Snoring With Pauses] : snoring with pauses [Hoarseness] : hoarseness [Throat Clearing] : throat clearing [Negative] : Heme/Lymph [Patient Intake Form Reviewed] : Patient intake form was reviewed

## 2024-05-15 NOTE — PHYSICAL EXAM
[Normal] : mucosa is normal [Midline] : trachea located in midline position [de-identified] : ERNIE EXOSTOSIS

## 2024-06-05 ENCOUNTER — RX RENEWAL (OUTPATIENT)
Age: 64
End: 2024-06-05

## 2024-06-06 RX ORDER — CELECOXIB 200 MG/1
200 CAPSULE ORAL
Qty: 90 | Refills: 0 | Status: ACTIVE | COMMUNITY
Start: 2021-12-27 | End: 1900-01-01

## 2024-07-16 ENCOUNTER — APPOINTMENT (OUTPATIENT)
Dept: OTOLARYNGOLOGY | Facility: CLINIC | Age: 64
End: 2024-07-16

## 2024-10-17 ENCOUNTER — RX RENEWAL (OUTPATIENT)
Age: 64
End: 2024-10-17

## 2024-12-03 NOTE — ED PROVIDER NOTE - EKG ADDITIONAL INFORMATION FREE TEXT
Route Chart for Scheduling    BMT Chart Routing      Follow up with physician . 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   noted on 8/27/2024   Line of treatment: Supportive Care   Treatment goal: Supportive     Upcoming Treatment Dates - OP FILGRASTIM 300 MCG    10/27/2024       Medications       filgrastim-sndz (ZARXIO) injection 300 mcg/0.5 mL (Preferred Regimen)  10/28/2024       Medications       filgrastim-sndz (ZARXIO) injection 300 mcg/0.5 mL (Preferred Regimen)  10/29/2024       Medications       filgrastim-sndz (ZARXIO) injection 300 mcg/0.5 mL (Preferred Regimen)  10/30/2024       Medications       filgrastim-sndz (ZARXIO) injection 300 mcg/0.5 mL (Preferred Regimen)    Therapy Plan Information  VITAMIN B12 (CYANOCOBALAMIN) IM for B12 deficiency, noted on 2/2/2024  cyanocobalamin injection 1,000 mcg  1,000 mcg, Intramuscular, Every visit    PORT FLUSH for Myelodysplastic syndrome, noted on 5/12/2023  Flushes  heparin, porcine (PF) 100 unit/mL injection flush 500 Units  500 Units, Intravenous, Every visit  sodium chloride 0.9% flush 10 mL  10 mL, Intravenous, Every visit      No therapy plan of the specified type found.   NSR

## 2024-12-05 ENCOUNTER — RX RENEWAL (OUTPATIENT)
Age: 64
End: 2024-12-05

## 2025-04-07 ENCOUNTER — APPOINTMENT (OUTPATIENT)
Dept: INTERNAL MEDICINE | Facility: CLINIC | Age: 65
End: 2025-04-07
Payer: COMMERCIAL

## 2025-04-07 ENCOUNTER — NON-APPOINTMENT (OUTPATIENT)
Age: 65
End: 2025-04-07

## 2025-04-07 VITALS
HEIGHT: 71 IN | BODY MASS INDEX: 30.94 KG/M2 | SYSTOLIC BLOOD PRESSURE: 120 MMHG | OXYGEN SATURATION: 98 % | HEART RATE: 56 BPM | WEIGHT: 221 LBS | DIASTOLIC BLOOD PRESSURE: 70 MMHG

## 2025-04-07 DIAGNOSIS — R91.8 OTHER NONSPECIFIC ABNORMAL FINDING OF LUNG FIELD: ICD-10-CM

## 2025-04-07 DIAGNOSIS — E03.9 HYPOTHYROIDISM, UNSPECIFIED: ICD-10-CM

## 2025-04-07 DIAGNOSIS — E78.5 HYPERLIPIDEMIA, UNSPECIFIED: ICD-10-CM

## 2025-04-07 DIAGNOSIS — R79.9 ABNORMAL FINDING OF BLOOD CHEMISTRY, UNSPECIFIED: ICD-10-CM

## 2025-04-07 DIAGNOSIS — N52.9 MALE ERECTILE DYSFUNCTION, UNSPECIFIED: ICD-10-CM

## 2025-04-07 DIAGNOSIS — M48.10 ANKYLOSING HYPEROSTOSIS [FORESTIER], SITE UNSPECIFIED: ICD-10-CM

## 2025-04-07 DIAGNOSIS — Z00.00 ENCOUNTER FOR GENERAL ADULT MEDICAL EXAMINATION W/OUT ABNORMAL FINDINGS: ICD-10-CM

## 2025-04-07 DIAGNOSIS — E29.1 TESTICULAR HYPOFUNCTION: ICD-10-CM

## 2025-04-07 DIAGNOSIS — H91.90 UNSPECIFIED HEARING LOSS, UNSPECIFIED EAR: ICD-10-CM

## 2025-04-07 DIAGNOSIS — R35.1 NOCTURIA: ICD-10-CM

## 2025-04-07 DIAGNOSIS — R79.89 OTHER SPECIFIED ABNORMAL FINDINGS OF BLOOD CHEMISTRY: ICD-10-CM

## 2025-04-07 PROCEDURE — 99386 PREV VISIT NEW AGE 40-64: CPT

## 2025-04-07 RX ORDER — CEPHALEXIN 500 MG/1
500 CAPSULE ORAL
Qty: 4 | Refills: 0 | Status: DISCONTINUED | COMMUNITY
Start: 2024-08-08

## 2025-04-07 RX ORDER — LEVOTHYROXINE SODIUM 0.15 MG/1
150 TABLET ORAL
Qty: 90 | Refills: 0 | Status: DISCONTINUED | COMMUNITY
Start: 2025-01-24

## 2025-04-07 RX ORDER — ATOMOXETINE 25 MG/1
25 CAPSULE ORAL
Qty: 30 | Refills: 0 | Status: DISCONTINUED | COMMUNITY
Start: 2025-03-22

## 2025-04-07 RX ORDER — DEXTROAMPHETAMINE SACCHARATE, AMPHETAMINE ASPARTATE, DEXTROAMPHETAMINE SULFATE AND AMPHETAMINE SULFATE 5; 5; 5; 5 MG/1; MG/1; MG/1; MG/1
20 TABLET ORAL
Qty: 30 | Refills: 0 | Status: DISCONTINUED | COMMUNITY
Start: 2025-03-22

## 2025-04-07 RX ORDER — IBUPROFEN 800 MG/1
800 TABLET, FILM COATED ORAL
Qty: 40 | Refills: 0 | Status: DISCONTINUED | COMMUNITY
Start: 2024-10-22

## 2025-04-07 RX ORDER — ROSUVASTATIN CALCIUM 10 MG/1
10 TABLET, FILM COATED ORAL
Qty: 90 | Refills: 0 | Status: ACTIVE | COMMUNITY
Start: 2025-01-09

## 2025-04-08 ENCOUNTER — APPOINTMENT (OUTPATIENT)
Dept: NEUROSURGERY | Facility: CLINIC | Age: 65
End: 2025-04-08

## 2025-04-18 ENCOUNTER — OUTPATIENT (OUTPATIENT)
Dept: OUTPATIENT SERVICES | Facility: HOSPITAL | Age: 65
LOS: 1 days | End: 2025-04-18
Payer: COMMERCIAL

## 2025-04-18 ENCOUNTER — APPOINTMENT (OUTPATIENT)
Dept: RADIOLOGY | Facility: CLINIC | Age: 65
End: 2025-04-18
Payer: COMMERCIAL

## 2025-04-18 ENCOUNTER — APPOINTMENT (OUTPATIENT)
Dept: NEUROSURGERY | Facility: CLINIC | Age: 65
End: 2025-04-18
Payer: COMMERCIAL

## 2025-04-18 ENCOUNTER — RESULT REVIEW (OUTPATIENT)
Age: 65
End: 2025-04-18

## 2025-04-18 VITALS
DIASTOLIC BLOOD PRESSURE: 79 MMHG | WEIGHT: 221 LBS | HEIGHT: 71 IN | BODY MASS INDEX: 30.94 KG/M2 | HEART RATE: 58 BPM | OXYGEN SATURATION: 98 % | SYSTOLIC BLOOD PRESSURE: 120 MMHG

## 2025-04-18 DIAGNOSIS — Z98.890 OTHER SPECIFIED POSTPROCEDURAL STATES: Chronic | ICD-10-CM

## 2025-04-18 DIAGNOSIS — M43.20 FUSION OF SPINE, SITE UNSPECIFIED: ICD-10-CM

## 2025-04-18 DIAGNOSIS — M25.511 PAIN IN RIGHT SHOULDER: ICD-10-CM

## 2025-04-18 DIAGNOSIS — M54.2 CERVICALGIA: ICD-10-CM

## 2025-04-18 DIAGNOSIS — M24.80 OTHER SPECIFIC JOINT DERANGEMENTS OF UNSPECIFIED JOINT, NOT ELSEWHERE CLASSIFIED: ICD-10-CM

## 2025-04-18 DIAGNOSIS — Z90.89 ACQUIRED ABSENCE OF OTHER ORGANS: Chronic | ICD-10-CM

## 2025-04-18 DIAGNOSIS — F19.90 OTHER PSYCHOACTIVE SUBSTANCE USE, UNSPECIFIED, UNCOMPLICATED: ICD-10-CM

## 2025-04-18 DIAGNOSIS — M25.512 PAIN IN RIGHT SHOULDER: ICD-10-CM

## 2025-04-18 DIAGNOSIS — G89.29 PAIN IN RIGHT SHOULDER: ICD-10-CM

## 2025-04-18 DIAGNOSIS — Z90.49 ACQUIRED ABSENCE OF OTHER SPECIFIED PARTS OF DIGESTIVE TRACT: Chronic | ICD-10-CM

## 2025-04-18 DIAGNOSIS — M54.12 RADICULOPATHY, CERVICAL REGION: ICD-10-CM

## 2025-04-18 DIAGNOSIS — R13.10 DYSPHAGIA, UNSPECIFIED: ICD-10-CM

## 2025-04-18 DIAGNOSIS — Z78.9 OTHER SPECIFIED HEALTH STATUS: ICD-10-CM

## 2025-04-18 PROCEDURE — 99214 OFFICE O/P EST MOD 30 MIN: CPT

## 2025-04-18 PROCEDURE — 73030 X-RAY EXAM OF SHOULDER: CPT | Mod: 26,50

## 2025-04-18 PROCEDURE — 72052 X-RAY EXAM NECK SPINE 6/>VWS: CPT | Mod: 26

## 2025-04-18 PROCEDURE — 72052 X-RAY EXAM NECK SPINE 6/>VWS: CPT

## 2025-04-18 PROCEDURE — 73030 X-RAY EXAM OF SHOULDER: CPT

## 2025-04-18 NOTE — ED ADULT NURSE NOTE - CCCP TRG CHIEF CMPLNT
"Care Coordinator  D/I: Per cards 2 rounds: Dr Rizwan Oconnell fellow/Dr Fang--they prefer that pt discharge on IV Tigecycline 50mg every day as there is NO oral alternative.  ---- They will discuss with ID if there is an oral regimen that he could do, as pt is unwilling to learn the IVAB administration for home--per [facility] liaison Amy Garcia, the tigecycline needs to be reconstituted from a vial and then transferred into the home pump(elastomeric ball) to infuse. Per Amy they WILL NOT provide an RN to visit every day--they will do up to 2 teaches in the hospital(with a caregiver to learn) and then  2-3 follow up visits in a row, beginning the day after discharge to follow up IV administration teaching AND they are providing a RN to visit on [facility] for his wound vac dressing changes.      Home Wound vac: Approved--need to call supply to have brought up Ph: [phone] or [phone] days or weekend [phone]. Go thru rental agreement, have pt sign it and fax to [employer].    IF pt opts for Outpatient Infusion Center for daily IVAB--MD needs to order an OP Therapy plan, then RN CC can call and schedule at the [facility] [place] [facility] Ph: [phone]  ---pt lives 5 minutes from there  ---may have to go to more than 1 OP  Infusion center as they are all very busy.  Then needs a NEW Home Care Agency---[facility] HAS accepted him and have a 4/21 visit on hold for the wound vac dressing change.    Dr Fang-tried to talk with pt and give him his options--pt did not want to talk.  Jinny MORA RN CC, jose l Santiago and myself attempted to talk with the pt and give him his options and he kicked all 3 of us out of the room and was yelling \"and all of you are full of shit. I am not leaving until you all get me a nurse to come to my house every day to get me my antibiotics.\" We informed Dr Oconnell and Dr Fang, unit RN manager Renetta Medina and Regan Palacios ANS.  OP cardiologist is Nasra Chua.  Per Jinny Morton--pt " "does NOT communicate with \"/[facility] cardiology\" as he has no cell phone, email or mychart.  A: pt unwilling to discuss discharge plan  P: MD to find out if there is an oral antibiotic pt can take. Will follow.  " R shoulder pain/numbness 2 days seen in [place]/discharged. Was jet skiing 2 days ago

## 2025-04-21 ENCOUNTER — APPOINTMENT (OUTPATIENT)
Dept: OTOLARYNGOLOGY | Facility: CLINIC | Age: 65
End: 2025-04-21

## 2025-04-21 VITALS
HEIGHT: 71 IN | DIASTOLIC BLOOD PRESSURE: 83 MMHG | SYSTOLIC BLOOD PRESSURE: 134 MMHG | WEIGHT: 220 LBS | HEART RATE: 51 BPM | BODY MASS INDEX: 30.8 KG/M2

## 2025-04-21 DIAGNOSIS — K21.9 GASTRO-ESOPHAGEAL REFLUX DISEASE W/OUT ESOPHAGITIS: ICD-10-CM

## 2025-04-21 DIAGNOSIS — R09.82 POSTNASAL DRIP: ICD-10-CM

## 2025-04-21 PROCEDURE — 99213 OFFICE O/P EST LOW 20 MIN: CPT | Mod: 25

## 2025-04-21 PROCEDURE — 31575 DIAGNOSTIC LARYNGOSCOPY: CPT

## 2025-04-21 RX ORDER — OMEPRAZOLE 40 MG/1
40 CAPSULE, DELAYED RELEASE ORAL
Qty: 30 | Refills: 2 | Status: ACTIVE | COMMUNITY
Start: 2025-04-21 | End: 1900-01-01

## 2025-04-21 RX ORDER — FLUTICASONE PROPIONATE 50 UG/1
50 SPRAY, METERED NASAL
Qty: 1 | Refills: 2 | Status: ACTIVE | COMMUNITY
Start: 2025-04-21 | End: 1900-01-01

## 2025-04-24 ENCOUNTER — APPOINTMENT (OUTPATIENT)
Dept: CT IMAGING | Facility: CLINIC | Age: 65
End: 2025-04-24

## 2025-04-24 ENCOUNTER — OUTPATIENT (OUTPATIENT)
Dept: OUTPATIENT SERVICES | Facility: HOSPITAL | Age: 65
LOS: 1 days | End: 2025-04-24
Payer: COMMERCIAL

## 2025-04-24 DIAGNOSIS — Z90.49 ACQUIRED ABSENCE OF OTHER SPECIFIED PARTS OF DIGESTIVE TRACT: Chronic | ICD-10-CM

## 2025-04-24 DIAGNOSIS — Z96.641 PRESENCE OF RIGHT ARTIFICIAL HIP JOINT: Chronic | ICD-10-CM

## 2025-04-24 DIAGNOSIS — Z98.890 OTHER SPECIFIED POSTPROCEDURAL STATES: Chronic | ICD-10-CM

## 2025-04-24 DIAGNOSIS — R91.8 OTHER NONSPECIFIC ABNORMAL FINDING OF LUNG FIELD: ICD-10-CM

## 2025-04-24 DIAGNOSIS — Z90.89 ACQUIRED ABSENCE OF OTHER ORGANS: Chronic | ICD-10-CM

## 2025-04-24 PROCEDURE — 71250 CT THORAX DX C-: CPT | Mod: 26

## 2025-04-29 ENCOUNTER — NON-APPOINTMENT (OUTPATIENT)
Age: 65
End: 2025-04-29

## 2025-05-14 ENCOUNTER — NON-APPOINTMENT (OUTPATIENT)
Age: 65
End: 2025-05-14

## 2025-06-03 ENCOUNTER — APPOINTMENT (OUTPATIENT)
Dept: NEUROSURGERY | Facility: CLINIC | Age: 65
End: 2025-06-03
Payer: COMMERCIAL

## 2025-06-03 VITALS
HEIGHT: 71 IN | OXYGEN SATURATION: 98 % | SYSTOLIC BLOOD PRESSURE: 142 MMHG | WEIGHT: 220 LBS | HEART RATE: 57 BPM | BODY MASS INDEX: 30.8 KG/M2 | DIASTOLIC BLOOD PRESSURE: 83 MMHG

## 2025-06-03 DIAGNOSIS — M75.02 ADHESIVE CAPSULITIS OF LEFT SHOULDER: ICD-10-CM

## 2025-06-03 DIAGNOSIS — M25.511 PAIN IN RIGHT SHOULDER: ICD-10-CM

## 2025-06-03 DIAGNOSIS — R13.10 DYSPHAGIA, UNSPECIFIED: ICD-10-CM

## 2025-06-03 DIAGNOSIS — M25.512 PAIN IN LEFT SHOULDER: ICD-10-CM

## 2025-06-03 DIAGNOSIS — M54.2 CERVICALGIA: ICD-10-CM

## 2025-06-03 DIAGNOSIS — G89.29 PAIN IN RIGHT SHOULDER: ICD-10-CM

## 2025-06-03 DIAGNOSIS — M43.20 FUSION OF SPINE, SITE UNSPECIFIED: ICD-10-CM

## 2025-06-03 DIAGNOSIS — M48.10 ANKYLOSING HYPEROSTOSIS [FORESTIER], SITE UNSPECIFIED: ICD-10-CM

## 2025-06-03 DIAGNOSIS — M25.512 PAIN IN RIGHT SHOULDER: ICD-10-CM

## 2025-06-03 PROCEDURE — 99214 OFFICE O/P EST MOD 30 MIN: CPT

## 2025-06-05 ENCOUNTER — APPOINTMENT (OUTPATIENT)
Dept: NEUROSURGERY | Facility: CLINIC | Age: 65
End: 2025-06-05

## 2025-06-05 PROBLEM — M75.02 ADHESIVE CAPSULITIS OF LEFT SHOULDER: Status: ACTIVE | Noted: 2025-06-05

## 2025-06-05 PROBLEM — M25.512 LEFT SHOULDER PAIN, UNSPECIFIED CHRONICITY: Status: ACTIVE | Noted: 2025-06-05

## 2025-07-01 ENCOUNTER — APPOINTMENT (OUTPATIENT)
Dept: ORTHOPEDIC SURGERY | Facility: CLINIC | Age: 65
End: 2025-07-01

## 2025-07-09 ENCOUNTER — RX RENEWAL (OUTPATIENT)
Age: 65
End: 2025-07-09

## 2025-07-14 ENCOUNTER — RX RENEWAL (OUTPATIENT)
Age: 65
End: 2025-07-14

## 2025-08-11 ENCOUNTER — RX RENEWAL (OUTPATIENT)
Age: 65
End: 2025-08-11

## 2025-09-11 ENCOUNTER — RX RENEWAL (OUTPATIENT)
Age: 65
End: 2025-09-11